# Patient Record
Sex: FEMALE | Race: WHITE | HISPANIC OR LATINO | Employment: FULL TIME | URBAN - METROPOLITAN AREA
[De-identification: names, ages, dates, MRNs, and addresses within clinical notes are randomized per-mention and may not be internally consistent; named-entity substitution may affect disease eponyms.]

---

## 2017-06-15 PROBLEM — R10.13 EPIGASTRIC PAIN: Status: ACTIVE | Noted: 2017-06-15

## 2017-06-15 PROBLEM — E80.6 HYPERBILIRUBINEMIA: Status: ACTIVE | Noted: 2017-06-15

## 2017-08-08 PROBLEM — K80.20 CALCULUS OF GALLBLADDER: Status: ACTIVE | Noted: 2017-08-08

## 2017-08-28 PROBLEM — E66.811 OBESITY, CLASS I, BMI 30-34.9: Status: ACTIVE | Noted: 2017-08-28

## 2017-08-28 PROBLEM — E80.6 HYPERBILIRUBINEMIA: Status: RESOLVED | Noted: 2017-06-15 | Resolved: 2017-08-28

## 2017-08-28 PROBLEM — K29.50 CHRONIC GASTRITIS: Chronic | Status: ACTIVE | Noted: 2017-08-28

## 2017-08-28 PROBLEM — E66.9 OBESITY, CLASS I, BMI 30-34.9: Status: ACTIVE | Noted: 2017-08-28

## 2017-08-29 PROBLEM — K80.20 CALCULUS OF GALLBLADDER WITHOUT CHOLECYSTITIS WITHOUT OBSTRUCTION: Status: ACTIVE | Noted: 2017-08-29

## 2019-10-25 ENCOUNTER — TELEPHONE (OUTPATIENT)
Dept: ADMINISTRATIVE | Facility: HOSPITAL | Age: 49
End: 2019-10-25

## 2019-10-28 ENCOUNTER — TELEPHONE (OUTPATIENT)
Dept: ADMINISTRATIVE | Facility: HOSPITAL | Age: 49
End: 2019-10-28

## 2020-06-30 ENCOUNTER — HISTORICAL (OUTPATIENT)
Dept: ADMINISTRATIVE | Facility: HOSPITAL | Age: 50
End: 2020-06-30

## 2020-06-30 LAB
COVID-19 INTERNAL CONTROL: NORMAL
SARS-COV-2 RNA RESP QL NAA+PROBE: NOT DETECTED

## 2020-08-29 ENCOUNTER — HOSPITAL ENCOUNTER (INPATIENT)
Facility: HOSPITAL | Age: 50
LOS: 3 days | Discharge: HOME | DRG: 310 | End: 2020-09-01
Attending: EMERGENCY MEDICINE | Admitting: INTERNAL MEDICINE
Payer: MEDICAID

## 2020-08-29 DIAGNOSIS — R07.9 CHEST PAIN: ICD-10-CM

## 2020-08-29 DIAGNOSIS — R79.89 ELEVATED TROPONIN: ICD-10-CM

## 2020-08-29 DIAGNOSIS — I47.10 SVT (SUPRAVENTRICULAR TACHYCARDIA): Primary | ICD-10-CM

## 2020-08-29 LAB
ALBUMIN SERPL BCP-MCNC: 3.9 G/DL (ref 3.5–5.2)
ALP SERPL-CCNC: 71 U/L (ref 55–135)
ALT SERPL W/O P-5'-P-CCNC: 34 U/L (ref 10–44)
AMPHET+METHAMPHET UR QL: NEGATIVE
ANION GAP SERPL CALC-SCNC: 9 MMOL/L (ref 8–16)
AST SERPL-CCNC: 22 U/L (ref 10–40)
BARBITURATES UR QL SCN>200 NG/ML: NEGATIVE
BASOPHILS # BLD AUTO: 0.07 K/UL (ref 0–0.2)
BASOPHILS NFR BLD: 0.9 % (ref 0–1.9)
BENZODIAZ UR QL SCN>200 NG/ML: NEGATIVE
BILIRUB SERPL-MCNC: 0.6 MG/DL (ref 0.1–1)
BILIRUB UR QL STRIP: NEGATIVE
BUN SERPL-MCNC: 11 MG/DL (ref 6–20)
BZE UR QL SCN: NEGATIVE
CALCIUM SERPL-MCNC: 8.9 MG/DL (ref 8.7–10.5)
CANNABINOIDS UR QL SCN: NEGATIVE
CHLORIDE SERPL-SCNC: 106 MMOL/L (ref 95–110)
CK MB SERPL-MCNC: 3.8 NG/ML (ref 0.1–6.5)
CK MB SERPL-RTO: 2.7 % (ref 0–5)
CK SERPL-CCNC: 141 U/L (ref 20–180)
CK SERPL-CCNC: 141 U/L (ref 20–180)
CLARITY UR: CLEAR
CO2 SERPL-SCNC: 23 MMOL/L (ref 23–29)
COLOR UR: YELLOW
CREAT SERPL-MCNC: 0.8 MG/DL (ref 0.5–1.4)
CREAT UR-MCNC: 81.5 MG/DL (ref 15–325)
DIFFERENTIAL METHOD: ABNORMAL
EOSINOPHIL # BLD AUTO: 0.1 K/UL (ref 0–0.5)
EOSINOPHIL NFR BLD: 0.9 % (ref 0–8)
ERYTHROCYTE [DISTWIDTH] IN BLOOD BY AUTOMATED COUNT: 12 % (ref 11.5–14.5)
EST. GFR  (AFRICAN AMERICAN): >60 ML/MIN/1.73 M^2
EST. GFR  (NON AFRICAN AMERICAN): >60 ML/MIN/1.73 M^2
GLUCOSE SERPL-MCNC: 141 MG/DL (ref 70–110)
GLUCOSE UR QL STRIP: NEGATIVE
HCT VFR BLD AUTO: 42.8 % (ref 37–48.5)
HGB BLD-MCNC: 14.4 G/DL (ref 12–16)
HGB UR QL STRIP: NEGATIVE
IMM GRANULOCYTES # BLD AUTO: 0.03 K/UL (ref 0–0.04)
IMM GRANULOCYTES NFR BLD AUTO: 0.4 % (ref 0–0.5)
KETONES UR QL STRIP: NEGATIVE
LEUKOCYTE ESTERASE UR QL STRIP: NEGATIVE
LIPASE SERPL-CCNC: 66 U/L (ref 23–300)
LYMPHOCYTES # BLD AUTO: 2.9 K/UL (ref 1–4.8)
LYMPHOCYTES NFR BLD: 34.7 % (ref 18–48)
MAGNESIUM SERPL-MCNC: 2 MG/DL (ref 1.6–2.6)
MCH RBC QN AUTO: 31.2 PG (ref 27–31)
MCHC RBC AUTO-ENTMCNC: 33.6 G/DL (ref 32–36)
MCV RBC AUTO: 93 FL (ref 82–98)
METHADONE UR QL SCN>300 NG/ML: NEGATIVE
MONOCYTES # BLD AUTO: 0.4 K/UL (ref 0.3–1)
MONOCYTES NFR BLD: 4.7 % (ref 4–15)
NEUTROPHILS # BLD AUTO: 4.8 K/UL (ref 1.8–7.7)
NEUTROPHILS NFR BLD: 58.4 % (ref 38–73)
NITRITE UR QL STRIP: NEGATIVE
NRBC BLD-RTO: 0 /100 WBC
OPIATES UR QL SCN: NEGATIVE
PCP UR QL SCN>25 NG/ML: NEGATIVE
PH UR STRIP: 7 [PH] (ref 5–8)
PLATELET # BLD AUTO: 336 K/UL (ref 150–350)
PMV BLD AUTO: 9.1 FL (ref 9.2–12.9)
POTASSIUM SERPL-SCNC: 3.5 MMOL/L (ref 3.5–5.1)
PROT SERPL-MCNC: 8.1 G/DL (ref 6–8.4)
PROT UR QL STRIP: NEGATIVE
RBC # BLD AUTO: 4.61 M/UL (ref 4–5.4)
SODIUM SERPL-SCNC: 138 MMOL/L (ref 136–145)
SP GR UR STRIP: 1.01 (ref 1–1.03)
TOXICOLOGY INFORMATION: NORMAL
URN SPEC COLLECT METH UR: NORMAL
UROBILINOGEN UR STRIP-ACNC: NEGATIVE EU/DL
WBC # BLD AUTO: 8.22 K/UL (ref 3.9–12.7)

## 2020-08-29 PROCEDURE — 80053 COMPREHEN METABOLIC PANEL: CPT

## 2020-08-29 PROCEDURE — 83690 ASSAY OF LIPASE: CPT

## 2020-08-29 PROCEDURE — 63600175 PHARM REV CODE 636 W HCPCS

## 2020-08-29 PROCEDURE — 93010 EKG 12-LEAD: ICD-10-PCS | Mod: ,,, | Performed by: INTERNAL MEDICINE

## 2020-08-29 PROCEDURE — 85025 COMPLETE CBC W/AUTO DIFF WBC: CPT

## 2020-08-29 PROCEDURE — 80307 DRUG TEST PRSMV CHEM ANLYZR: CPT

## 2020-08-29 PROCEDURE — 82553 CREATINE MB FRACTION: CPT

## 2020-08-29 PROCEDURE — 11000001 HC ACUTE MED/SURG PRIVATE ROOM

## 2020-08-29 PROCEDURE — 81003 URINALYSIS AUTO W/O SCOPE: CPT | Mod: 59

## 2020-08-29 PROCEDURE — 82550 ASSAY OF CK (CPK): CPT

## 2020-08-29 PROCEDURE — 96374 THER/PROPH/DIAG INJ IV PUSH: CPT

## 2020-08-29 PROCEDURE — 83735 ASSAY OF MAGNESIUM: CPT

## 2020-08-29 PROCEDURE — 93010 ELECTROCARDIOGRAM REPORT: CPT | Mod: ,,, | Performed by: INTERNAL MEDICINE

## 2020-08-29 PROCEDURE — 99285 EMERGENCY DEPT VISIT HI MDM: CPT | Mod: 25

## 2020-08-29 PROCEDURE — 93005 ELECTROCARDIOGRAM TRACING: CPT

## 2020-08-29 RX ORDER — ADENOSINE 3 MG/ML
6 INJECTION, SOLUTION INTRAVENOUS
Status: COMPLETED | OUTPATIENT
Start: 2020-08-29 | End: 2020-08-29

## 2020-08-29 RX ORDER — ADENOSINE 3 MG/ML
INJECTION, SOLUTION INTRAVENOUS
Status: COMPLETED
Start: 2020-08-29 | End: 2020-08-29

## 2020-08-29 RX ORDER — LISINOPRIL 10 MG/1
10 TABLET ORAL DAILY
COMMUNITY
End: 2021-11-15

## 2020-08-29 RX ADMIN — ADENOSINE 6 MG: 3 INJECTION, SOLUTION INTRAVENOUS at 09:08

## 2020-08-30 PROBLEM — I47.10 SVT (SUPRAVENTRICULAR TACHYCARDIA): Status: ACTIVE | Noted: 2020-08-30

## 2020-08-30 LAB
MAGNESIUM SERPL-MCNC: 2 MG/DL (ref 1.6–2.6)
SARS-COV-2 RDRP RESP QL NAA+PROBE: NEGATIVE
TROPONIN I SERPL DL<=0.01 NG/ML-MCNC: 0.23 NG/ML (ref 0–0.03)
TROPONIN I SERPL DL<=0.01 NG/ML-MCNC: 0.27 NG/ML (ref 0–0.03)
TSH SERPL DL<=0.005 MIU/L-ACNC: 3.39 UIU/ML (ref 0.4–4)

## 2020-08-30 PROCEDURE — 11000001 HC ACUTE MED/SURG PRIVATE ROOM

## 2020-08-30 PROCEDURE — 36415 COLL VENOUS BLD VENIPUNCTURE: CPT

## 2020-08-30 PROCEDURE — G0378 HOSPITAL OBSERVATION PER HR: HCPCS

## 2020-08-30 PROCEDURE — 96372 THER/PROPH/DIAG INJ SC/IM: CPT

## 2020-08-30 PROCEDURE — 84484 ASSAY OF TROPONIN QUANT: CPT | Mod: 91

## 2020-08-30 PROCEDURE — 25000003 PHARM REV CODE 250: Performed by: INTERNAL MEDICINE

## 2020-08-30 PROCEDURE — 63600175 PHARM REV CODE 636 W HCPCS: Performed by: INTERNAL MEDICINE

## 2020-08-30 PROCEDURE — 83735 ASSAY OF MAGNESIUM: CPT

## 2020-08-30 PROCEDURE — U0002 COVID-19 LAB TEST NON-CDC: HCPCS

## 2020-08-30 PROCEDURE — 84443 ASSAY THYROID STIM HORMONE: CPT

## 2020-08-30 RX ORDER — PANTOPRAZOLE SODIUM 40 MG/10ML
40 INJECTION, POWDER, LYOPHILIZED, FOR SOLUTION INTRAVENOUS DAILY
Status: DISCONTINUED | OUTPATIENT
Start: 2020-08-30 | End: 2020-09-01 | Stop reason: HOSPADM

## 2020-08-30 RX ORDER — ENOXAPARIN SODIUM 100 MG/ML
40 INJECTION SUBCUTANEOUS EVERY 24 HOURS
Status: DISCONTINUED | OUTPATIENT
Start: 2020-08-30 | End: 2020-09-01 | Stop reason: HOSPADM

## 2020-08-30 RX ORDER — ONDANSETRON 4 MG/1
4 TABLET, FILM COATED ORAL EVERY 6 HOURS PRN
Status: DISCONTINUED | OUTPATIENT
Start: 2020-08-30 | End: 2020-09-01 | Stop reason: HOSPADM

## 2020-08-30 RX ORDER — PANTOPRAZOLE SODIUM 40 MG/1
40 TABLET, DELAYED RELEASE ORAL DAILY
Status: DISCONTINUED | OUTPATIENT
Start: 2020-08-30 | End: 2020-08-30

## 2020-08-30 RX ORDER — SODIUM CHLORIDE 0.9 % (FLUSH) 0.9 %
10 SYRINGE (ML) INJECTION
Status: DISCONTINUED | OUTPATIENT
Start: 2020-08-30 | End: 2020-09-01 | Stop reason: HOSPADM

## 2020-08-30 RX ORDER — LISINOPRIL 10 MG/1
10 TABLET ORAL DAILY
Status: DISCONTINUED | OUTPATIENT
Start: 2020-08-30 | End: 2020-09-01 | Stop reason: HOSPADM

## 2020-08-30 RX ORDER — DOCUSATE SODIUM 100 MG/1
100 CAPSULE, LIQUID FILLED ORAL 2 TIMES DAILY
Status: DISCONTINUED | OUTPATIENT
Start: 2020-08-30 | End: 2020-09-01 | Stop reason: HOSPADM

## 2020-08-30 RX ADMIN — LISINOPRIL 10 MG: 10 TABLET ORAL at 11:08

## 2020-08-30 RX ADMIN — DOCUSATE SODIUM 100 MG: 100 CAPSULE, LIQUID FILLED ORAL at 11:08

## 2020-08-30 RX ADMIN — PANTOPRAZOLE SODIUM 40 MG: 40 TABLET, DELAYED RELEASE ORAL at 11:08

## 2020-08-30 RX ADMIN — DOCUSATE SODIUM 100 MG: 100 CAPSULE, LIQUID FILLED ORAL at 08:08

## 2020-08-30 RX ADMIN — ENOXAPARIN SODIUM 40 MG: 40 INJECTION SUBCUTANEOUS at 04:08

## 2020-08-30 NOTE — NURSING
Pt up floor via wheelchair.  No signs of distress noted at this time. Denies pain or discomfort. V/S WNL.  Pt oriented to room.Instructed to call for assistance if needed.

## 2020-08-30 NOTE — ED PROVIDER NOTES
Encounter Date: 8/29/2020       History     Chief Complaint   Patient presents with    Weakness     Daughter reports pt has been working and started having chest pain. Pt having extreme weakness. Pt neuro exam equal but poor effort.     Chest Pain     Patient is a 50-year-old  female with past medical history significant for chronic gastritis, gallbladder issues, hypertension who presents to the emergency department with 1 hr history of chest pressure, palpitations and mild shortness of breath.  Patient's daughter is translating for us.        Review of patient's allergies indicates:  No Known Allergies  Past Medical History:   Diagnosis Date    Calculus of gallbladder     Chronic gastritis 8/28/2017    GERD (gastroesophageal reflux disease)     H. pylori infection     Hyperbilirubinemia 6/15/2017    Hypertension     Obesity      Past Surgical History:   Procedure Laterality Date    APPENDECTOMY      CHOLECYSTECTOMY N/A 08/29/2017    WISDOM TOOTH EXTRACTION       Family History   Problem Relation Age of Onset    Heart attack Father     Lung cancer Maternal Grandmother     Breast cancer Paternal Aunt     Breast cancer Paternal Uncle     Breast cancer Paternal Grandmother      Social History     Tobacco Use    Smoking status: Never Smoker    Smokeless tobacco: Never Used   Substance Use Topics    Alcohol use: Yes    Drug use: No     Review of Systems   Constitutional: Positive for diaphoresis. Negative for chills, fever and unexpected weight change.   HENT: Negative for congestion.    Respiratory: Negative for cough, chest tightness, shortness of breath, wheezing and stridor.    Cardiovascular: Positive for chest pain and palpitations. Negative for leg swelling.   Gastrointestinal: Negative for abdominal pain, anal bleeding, blood in stool, constipation, diarrhea, nausea and vomiting.   Genitourinary: Negative for dysuria, flank pain, frequency and hematuria.   Musculoskeletal: Negative  for back pain and neck pain.   Neurological: Negative for dizziness and headaches.   All other systems reviewed and are negative.      Physical Exam     Initial Vitals   BP Pulse Resp Temp SpO2   08/29/20 2153 08/29/20 2148 08/29/20 2148 08/30/20 0048 08/29/20 2148   (!) 138/104 (!) 189 (!) 26 97.7 °F (36.5 °C) 96 %      MAP       --                Physical Exam    Nursing note and vitals reviewed.  Constitutional: She appears well-developed and well-nourished. She is not diaphoretic. No distress.   HENT:   Head: Normocephalic and atraumatic.   Neck: Normal range of motion. Neck supple.   Cardiovascular: Regular rhythm, normal heart sounds and intact distal pulses.   Tachycardic in SVT   Pulmonary/Chest: Breath sounds normal. She is in respiratory distress. She has no wheezes. She has no rhonchi. She has no rales. She exhibits no tenderness.   Abdominal: Soft. She exhibits no distension. There is no abdominal tenderness. There is no rebound and no guarding.   Musculoskeletal: Normal range of motion. No edema.   Neurological: She is alert and oriented to person, place, and time.   Skin: Skin is warm and dry.         ED Course   Procedures  Labs Reviewed   CBC W/ AUTO DIFFERENTIAL - Abnormal; Notable for the following components:       Result Value    Mean Corpuscular Hemoglobin 31.2 (*)     MPV 9.1 (*)     All other components within normal limits   COMPREHENSIVE METABOLIC PANEL - Abnormal; Notable for the following components:    Glucose 141 (*)     All other components within normal limits   DRUG SCREEN PANEL, URINE EMERGENCY    Narrative:     Specimen Source->Urine   LIPASE   MAGNESIUM   URINALYSIS    Narrative:     Specimen Source->Urine   CK   CK-MB   SARS-COV-2 RNA AMPLIFICATION, QUAL        ECG Results          EKG 12-lead (Final result)  Result time 08/30/20 00:50:14    Final result by Interface, Lab In [facility] (08/30/20 00:50:14)                 Narrative:    Test Reason : R07.9,    Vent. Rate : 130 BPM      Atrial Rate : 130 BPM     P-R Int : 142 ms          QRS Dur : 078 ms      QT Int : 314 ms       P-R-T Axes : 065 049 062 degrees     QTc Int : 462 ms    Sinus tachycardia  Otherwise normal ECG  Abnormal ECG  When compared with ECG of 29-AUG-2020 21:47,  Decrease in heart rate by 60 bpm  ST no longer depressed in Inferior leads  ST no longer depressed in Lateral leads    Confirmed by Donnie Perez MD ([id number]) on 8/30/2020 12:50:02 AM    Referred By: System System           Confirmed By:Donnie Perez MD                             EKG 12-lead (Final result)  Result time 08/30/20 00:46:56    Final result by Interface, Lab In [facility] (08/30/20 00:46:56)                 Narrative:    Test Reason : R07.9,    Vent. Rate : 191 BPM     Atrial Rate : 192 BPM     P-R Int : 000 ms          QRS Dur : 072 ms      QT Int : 242 ms       P-R-T Axes : 000 058 254 degrees     QTc Int : 431 ms    Supraventricular tachycardia  Marked ST abnormality, possible inferolateral subendocardial injury  Abnormal ECG  No previous ECGs available  Confirmed by Donnie Perez MD ([id number]) on 8/30/2020 12:46:41 AM    Referred By: System System           Confirmed By:Donnie Perez MD                            Imaging Results    None                            ED Course as of Aug 30 0059   Sun Aug 30, 2020   0053 Patient's lab work is unimpressive, and cardiac workup as well as negative.  Patient was in supraventricular tachycardia with a rate of 190.  Patient converted with 6 mg of adenosine and symptoms completely resolved.  Currently patient's heart rate is normal sinus rhythm at 86.  Will admit under observation    [RB]      ED Course User Index  [RB] Sharath Doe MD                Clinical Impression:       ICD-10-CM ICD-9-CM   1. SVT (supraventricular tachycardia)  I47.1 427.89   2. Chest pain  R07.9 786.50             ED Disposition Condition    Admit                           Sharath Doe MD  08/30/20 0057       Sharath Doe MD  08/30/20 0059

## 2020-08-30 NOTE — ED NOTES
Informed pt that she will be going up to a room on med surg when nurse is ready to assume care. Pt v/u. Denies pain. V/s stable.Family at bedside. Call bell within reach. Pt resting comfortably at present.

## 2020-08-30 NOTE — H&P
[facility]    History & Physical      Patient Name: Awa Vargas  MRN: [id number]  Admission Date: 8/29/2020  Attending Physician: Jones Anaya MD   Primary Care Provider: [facility]         Patient information was obtained from patient and ER records.     Subjective:     Principal Problem:SVT (supraventricular tachycardia)    Chief Complaint:   Chief Complaint   Patient presents with    Weakness     Daughter reports pt has been working and started having chest pain. Pt having extreme weakness. Pt neuro exam equal but poor effort.     Chest Pain        HPI: 51 y/o Nepali female with history of gallstones, post cholecystectomy, gerd presented to the ed with complaints of worsening weakness and chest discomfort and further work up and eval in ed revealing that she was in svt and coverted medically with adneosine and still cmplaints of chest discomforta nd troponins seems to be levated and further admitted for obs for nstemi and psvt, still complaints of midsternal chest discomfort    Past Medical History:   Diagnosis Date    Calculus of gallbladder     Chronic gastritis 8/28/2017    GERD (gastroesophageal reflux disease)     H. pylori infection     Hyperbilirubinemia 6/15/2017    Hypertension     Obesity        Past Surgical History:   Procedure Laterality Date    APPENDECTOMY      CHOLECYSTECTOMY N/A 08/29/2017    WISDOM TOOTH EXTRACTION         Review of patient's allergies indicates:  No Known Allergies    No current facility-administered medications on file prior to encounter.      Current Outpatient Medications on File Prior to Encounter   Medication Sig    lisinopriL 10 MG tablet Take 10 mg by mouth once daily.    docusate sodium (COLACE) 100 MG capsule Take 1 capsule (100 mg total) by mouth 2 (two) times daily.    omeprazole (PRILOSEC) 40 MG capsule Take 1 capsule (40 mg total) by mouth once daily.    ondansetron (ZOFRAN) 4 MG tablet Take 1  tablet (4 mg total) by mouth every 8 (eight) hours as needed.    oxycodone-acetaminophen (PERCOCET) 5-325 mg per tablet Take 1-2 tablets by mouth every 4 (four) hours as needed for Pain.     Family History     Problem Relation (Age of Onset)    Breast cancer Paternal Aunt, Paternal Uncle, Paternal Grandmother    Heart attack Father    Lung cancer Maternal Grandmother        Tobacco Use    Smoking status: Never Smoker    Smokeless tobacco: Never Used   Substance and Sexual Activity    Alcohol use: Yes    Drug use: No    Sexual activity: Not on file     Review of Systems   Constitutional: Positive for fatigue.   HENT: Negative.    Eyes: Negative.    Respiratory: Negative.    Cardiovascular: Positive for chest pain.   Gastrointestinal: Negative.    Endocrine: Negative.    Genitourinary: Negative.    Musculoskeletal: Negative.    Skin: Negative.    Allergic/Immunologic: Negative.    Neurological: Negative.    Hematological: Negative.    Psychiatric/Behavioral: The patient is nervous/anxious.      Objective:     Vital Signs (Most Recent):  Temp: 98.2 °F (36.8 °C) (08/30/20 0729)  Pulse: 71 (08/30/20 0729)  Resp: 20 (08/30/20 0729)  BP: 125/74 (08/30/20 0729)  SpO2: 99 % (08/30/20 0729) Vital Signs (24h Range):  Temp:  [97.6 °F (36.4 °C)-98.2 °F (36.8 °C)] 98.2 °F (36.8 °C)  Pulse:  [] 71  Resp:  [10-26] 20  SpO2:  [95 %-100 %] 99 %  BP: (122-161)/() 125/74     Weight: 83.9 kg (185 lb)  Body mass index is 34.96 kg/m².    Physical Exam  Constitutional:       Appearance: Normal appearance. She is obese.   HENT:      Head: Normocephalic and atraumatic.      Right Ear: Tympanic membrane normal.      Left Ear: Tympanic membrane normal.      Nose: Nose normal.      Mouth/Throat:      Mouth: Mucous membranes are dry.   Eyes:      Pupils: Pupils are equal, round, and reactive to light.   Neck:      Musculoskeletal: Normal range of motion and neck supple.   Cardiovascular:      Rate and Rhythm: Regular rhythm.  Tachycardia present.      Pulses: Normal pulses.      Heart sounds: Normal heart sounds.   Pulmonary:      Breath sounds: Normal breath sounds.   Abdominal:      General: Abdomen is flat. Bowel sounds are normal.      Palpations: Abdomen is soft.   Musculoskeletal: Normal range of motion.   Skin:     General: Skin is warm and dry.      Capillary Refill: Capillary refill takes less than 2 seconds.   Neurological:      General: No focal deficit present.      Mental Status: She is alert.   Psychiatric:         Mood and Affect: Mood normal.         Behavior: Behavior normal.           CRANIAL NERVES     CN III, IV, VI   Pupils are equal, round, and reactive to light.      Significant Labs:   Recent Lab Results       08/30/20  0838   08/30/20  0316   08/30/20  0056   08/29/20  2218   08/29/20  2156        Lipase Result         66     Benzodiazepines       Negative       Methadone metabolites       Negative       Phencyclidine       Negative       Albumin         3.9     Alkaline Phosphatase         71     ALT         34     Amphetamine Screen, Ur       Negative       Anion Gap         9     Appearance, UA       Clear       AST         22     Barbiturate Screen, Ur       Negative       Baso #               Basophil%               Bilirubin (UA)       Negative       BILIRUBIN TOTAL         0.6  Comment:  For infants and newborns, interpretation of results should be based  on gestational age, weight and in agreement with clinical  observations.  Premature Infant recommended reference ranges:  Up to 24 hours.............<8.0 mg/dL  Up to 48 hours............<12.0 mg/dL  3-5 days..................<15.0 mg/dL  6-29 days.................<15.0 mg/dL  For patients on Eltrombopag therapy, use of Dimension [place] TBIL is   not   recommended.       BUN, Bld         11     Calcium         8.9     Chloride         106     CO2         23     Cocaine (Metab.)       Negative       Color, UA       Yellow       CPK         141               141     CPK MB         3.8  Comment:  ATTENTION: The use of Biotin Supplements may interfere with this   assay.       Creatinine         0.8     Creatinine, Random Ur       81.5  Comment:  The random urine reference ranges provided were established   for 24 hour urine collections.  No reference ranges exist for  random urine specimens.  Correlate clinically.         Differential Method               eGFR if          >60.0     eGFR if non          >60.0  Comment:  Calculation used to obtain the estimated glomerular filtration  rate (eGFR) is the CKD-EPI equation.        Eos #               Eosinophil%               Glucose         141     Glucose, UA       Negative       Gran # (ANC)               Gran%               Hematocrit               Hemoglobin               Immature Grans (Abs)               Immature Granulocytes               Ketones, UA       Negative       Leukocytes, UA       Negative       Lymph #               Lymph%               Magnesium 2.0       2.0     MB%         2.7  Comment:  To be positive, the MB% must be greater than 5% AND the CK-MB  greater than 6.5 ng/mL. Values not in the reference interval,   but not qualifying as positive, should be considered   &quot;trace&quot;.       MCH               MCHC               MCV               Mono #               Mono%               MPV               NITRITE UA       Negative       nRBC               Occult Blood UA       Negative       Opiate Scrn, Ur       Negative       pH, UA       7.0       Platelets               Potassium         3.5     PROTEIN TOTAL         8.1     Protein, UA       Negative  Comment:  Recommend a 24 hour urine protein or a urine   protein/creatinine ratio if globulin induced proteinuria is  clinically suspected.         RBC               RDW               SARS-CoV-2 RNA, Amplification, Qual     Negative  Comment:  This test utilizes isothermal nucleic acid amplification   technology to detect  the SARS-CoV-2 RdRp nucleic acid segment.   The analytical sensitivity (limit of detection) is 125 genome   equivalents/mL.   A POSITIVE result implies infection with the SARS-CoV-2 virus;  the patient is presumed to be contagious.    A NEGATIVE result means that SARS-CoV-2 nucleic acids are not  present above the limit of detection. A NEGATIVE result should be   treated as presumptive. It does not rule out the possibility of   COVID-19 and should not be the sole basis for treatment decisions.   If COVID-19 is strongly suspected based on clinical and exposure   history, re-testing using an alternate molecular assay should be   considered.   This test is only for use under the Food and Drug   Administration s Emergency Use Authorization (EUA).   Commercial kits are provided by [employer].   Performance characteristics of the EUA have been independently  verified by [facility] Department of  Pathology and Laboratory Medicine.   _________________________________________________________________  The ID NOW COVID-19 Letter of Authorization, along with the   authorized Fact Sheet for Healthcare Providers, the authorized Fact  Sheet for Patients, and authorized labeling are available on the FDA   website:  www.fda.gov/MedicalDevices/Safety/EmergencySituations/hwp619191.htm           Sodium         138     Specific [place], UA       1.015       Specimen UA       Urine, Clean Catch       Marijuana (THC) Metabolite       Negative       Toxicology Information       SEE COMMENT  Comment:  This screen includes the following classes of drugs at the   listed cut-off:  Benzodiazepines                  200 ng/ml  Methadone                        300 ng/ml  Cocaine metabolite               300 ng/ml  Opiates                         2000 ng/ml  Barbiturates                     200 ng/ml  Amphetamines                    1000 ng/ml  Marijuana metabs (THC)            50 ng/ml  Phencyclidine (PCP)               25  ng/ml  **Intended use : The UDS methods provide only a preliminary result.    A more   specific alternate chemical method must be used in order to obtain a   confirmed analytical result.  Gas chromatography mass spectrometry   (GC/MS)   is the preferred confirmatory method.  Clinical consideration and   professional judgement should be applied to any drug of abuse test   result.    Particularly when preliminary results are used.     ** Results:  Positive results are only preliminary and only suggest   the   sample may contain the analyte being tested.  Negative results   indicate that   the sample does not contain the analyte or the analyte is present in   concentrations below the cut-off level.         Troponin I 0.235  Comment:  The reference interval for Troponin I represents the 99th percentile   cutoff   for our facility and is consistent with 3rd generation assay   performance.  ATTENTION: The use of Biotin Supplements may interfere with this   assay.   0.266  Comment:  The reference interval for Troponin I represents the 99th percentile   cutoff   for our facility and is consistent with 3rd generation assay   performance.  ATTENTION: The use of Biotin Supplements may interfere with this   assay.             TSH 3.390  Comment:  ATTENTION: The use of Biotin Supplements may interfere with this   assay.               UROBILINOGEN UA       Negative       WBC                                08/29/20  2155        Lipase Result       Benzodiazepines       Methadone metabolites       Phencyclidine       Albumin       Alkaline Phosphatase       ALT       Amphetamine Screen, Ur       Anion Gap       Appearance, UA       AST       Barbiturate Screen, Ur       Baso # 0.07     Basophil% 0.9     Bilirubin (UA)       BILIRUBIN TOTAL       BUN, Bld       Calcium       Chloride       CO2       Cocaine (Metab.)       Color, UA       CPK       CPK MB       Creatinine       Creatinine, Random Ur       Differential Method  Automated     eGFR if        eGFR if non        Eos # 0.1     Eosinophil% 0.9     Glucose       Glucose, UA       Gran # (ANC) 4.8     Gran% 58.4     Hematocrit 42.8     Hemoglobin 14.4     Immature Grans (Abs) 0.03  Comment:  Mild elevation in immature granulocytes is non specific and   can be seen in a variety of conditions including stress response,   acute inflammation, trauma and pregnancy. Correlation with other   laboratory and clinical findings is essential.       Immature Granulocytes 0.4     Ketones, UA       Leukocytes, UA       Lymph # 2.9     Lymph% 34.7     Magnesium       MB%       MCH 31.2     MCHC 33.6     MCV 93     Mono # 0.4     Mono% 4.7     MPV 9.1     NITRITE UA       nRBC 0     Occult Blood UA       Opiate Scrn, Ur       pH, UA       Platelets 336     Potassium       PROTEIN TOTAL       Protein, UA       RBC 4.61     RDW 12.0     SARS-CoV-2 RNA, Amplification, Qual       Sodium       Specific Gravity, UA       Specimen UA       Marijuana (THC) Metabolite       Toxicology Information       Troponin I       TSH       UROBILINOGEN UA       WBC 8.22               Assessment/Plan:     Active Diagnoses:    Diagnosis Date Noted POA    PRINCIPAL PROBLEM:  SVT (supraventricular tachycardia) [I47.1] 08/30/2020 Yes      Problems Resolved During this Admission:     VTE Risk Mitigation (From admission, onward)         Ordered     enoxaparin injection 40 mg  Every 24 hours      08/30/20 1050     IP VTE LOW RISK PATIENT  Once      08/30/20 0305            NSTEMI  PSV - RESOLVED  ?GERD  H/O CHOLITHIASIS    PLAN :  TELE  TREND ENZYMES  ADD IV PROTONIX  CONTINUE TO MONITOR FOR TODAY  POSSIBLE DC IN AM IF SYMPTOMS RESOLVES AND ENZYMES NEGATIVE WITH CLOSE FOLLOW UP WITH [facility] OUT PT  CODE STATUS FULL      Jones Anaya MD  Department of Hospital Medicine   [facility]

## 2020-08-30 NOTE — ED NOTES
"Pt states she is feeling "much better". Pt up to [facility] without feeling dizzy, HR increases, or chest pain.   "

## 2020-08-31 PROBLEM — F41.9 ANXIETY: Status: ACTIVE | Noted: 2020-08-31

## 2020-08-31 PROBLEM — R79.89 ELEVATED TROPONIN: Status: ACTIVE | Noted: 2020-08-31

## 2020-08-31 PROBLEM — R07.9 CHEST PAIN: Status: ACTIVE | Noted: 2020-08-31

## 2020-08-31 LAB — TROPONIN I SERPL DL<=0.01 NG/ML-MCNC: 0.09 NG/ML (ref 0–0.03)

## 2020-08-31 PROCEDURE — 36415 COLL VENOUS BLD VENIPUNCTURE: CPT

## 2020-08-31 PROCEDURE — 84484 ASSAY OF TROPONIN QUANT: CPT

## 2020-08-31 PROCEDURE — 96375 TX/PRO/DX INJ NEW DRUG ADDON: CPT

## 2020-08-31 PROCEDURE — C9113 INJ PANTOPRAZOLE SODIUM, VIA: HCPCS | Performed by: INTERNAL MEDICINE

## 2020-08-31 PROCEDURE — 11000001 HC ACUTE MED/SURG PRIVATE ROOM

## 2020-08-31 PROCEDURE — 25000003 PHARM REV CODE 250: Performed by: INTERNAL MEDICINE

## 2020-08-31 PROCEDURE — 25000003 PHARM REV CODE 250: Performed by: NURSE PRACTITIONER

## 2020-08-31 PROCEDURE — 63600175 PHARM REV CODE 636 W HCPCS: Performed by: INTERNAL MEDICINE

## 2020-08-31 RX ORDER — METOPROLOL SUCCINATE 25 MG/1
25 TABLET, EXTENDED RELEASE ORAL DAILY
Status: DISCONTINUED | OUTPATIENT
Start: 2020-08-31 | End: 2020-09-01 | Stop reason: HOSPADM

## 2020-08-31 RX ADMIN — PANTOPRAZOLE SODIUM 40 MG: 40 INJECTION, POWDER, LYOPHILIZED, FOR SOLUTION INTRAVENOUS at 09:08

## 2020-08-31 RX ADMIN — METOPROLOL SUCCINATE 25 MG: 25 TABLET, EXTENDED RELEASE ORAL at 02:08

## 2020-08-31 RX ADMIN — LISINOPRIL 10 MG: 10 TABLET ORAL at 08:08

## 2020-08-31 RX ADMIN — ENOXAPARIN SODIUM 40 MG: 40 INJECTION SUBCUTANEOUS at 05:08

## 2020-08-31 RX ADMIN — DOCUSATE SODIUM 100 MG: 100 CAPSULE, LIQUID FILLED ORAL at 08:08

## 2020-08-31 NOTE — PROGRESS NOTES
[facility] Medicine  Progress Note    Patient Name: Awa Vargas  MRN: [id number]  Patient Class: OP- Observation   Admission Date: 8/29/2020  Length of Stay: 0 days  Attending Physician: Ioana Denney MD  Primary Care Provider: [facility]        Subjective:     Principal Problem:SVT (supraventricular tachycardia)        HPI:  49 y/o Lebanese female with history of hypertension, gallstones, post cholecystectomy, gerd presented to the ed with complaints of worsening weakness and chest discomfort and further work up and eval in ed revealing that she was in svt and coverted medically with adneosine and still complaints of chest discomforta and troponins seems to be elevated and further admitted for  nstemi and psvt, still complaints of midsternal chest discomfort. Patient reports that she has similar episode with pregnancy and was instructed to follow up with cardiology however never did so.  She has been experiencing a lot of anxiety lately. Sees [facility] for PCP    Overview/Hospital Course:  8/31/20 LF patient via  reports that chest pain is improved but still present. States pain is midsternal and feels like pushing. She is still complaining of weakness and overall not feeling good    Interval History: see hospital course    Review of Systems   Constitutional: Positive for fatigue. Negative for activity change, appetite change, chills, diaphoresis, fever and unexpected weight change.   HENT: Negative for congestion, postnasal drip, sore throat and trouble swallowing.    Eyes: Negative for discharge.   Respiratory: Positive for chest tightness. Negative for cough, shortness of breath and wheezing.    Cardiovascular: Positive for chest pain. Negative for palpitations and leg swelling.   Gastrointestinal: Negative for abdominal pain, blood in stool, constipation, diarrhea, nausea and vomiting.   Genitourinary: Negative for decreased urine  volume, difficulty urinating, dysuria, hematuria and urgency.   Musculoskeletal: Negative for arthralgias.   Skin: Negative for rash and wound.   Neurological: Negative for dizziness, speech difficulty, numbness and headaches.   Psychiatric/Behavioral: Negative for agitation and sleep disturbance.     Objective:     Vital Signs (Most Recent):  Temp: 98.2 °F (36.8 °C) (08/31/20 0645)  Pulse: 69 (08/31/20 0645)  Resp: 18 (08/31/20 0645)  BP: 114/71 (08/31/20 0645)  SpO2: 99 % (08/31/20 0645) Vital Signs (24h Range):  Temp:  [97.8 °F (36.6 °C)-98.7 °F (37.1 °C)] 98.2 °F (36.8 °C)  Pulse:  [66-82] 69  Resp:  [16-20] 18  SpO2:  [95 %-100 %] 99 %  BP: (114-134)/(64-80) 114/71     Weight: 83.9 kg (185 lb)  Body mass index is 34.96 kg/m².    Intake/Output Summary (Last 24 hours) at 8/31/2020 0947  Last data filed at 8/31/2020 0600  Gross per 24 hour   Intake 3960 ml   Output 2950 ml   Net 1010 ml      Physical Exam  Vitals signs and nursing note reviewed.   Constitutional:       Appearance: Normal appearance.   HENT:      Head: Normocephalic and atraumatic.      Right Ear: Hearing normal.      Left Ear: Hearing normal.      Nose: Nose normal.      Mouth/Throat:      Lips: Pink.      Mouth: Mucous membranes are moist.   Eyes:      Pupils: Pupils are equal, round, and reactive to light.   Neck:      Musculoskeletal: Full passive range of motion without pain and normal range of motion.   Cardiovascular:      Rate and Rhythm: Normal rate and regular rhythm.      Pulses: Normal pulses.      Heart sounds: Normal heart sounds.   Pulmonary:      Effort: Pulmonary effort is normal.      Breath sounds: Normal breath sounds.   Abdominal:      General: Abdomen is flat. Bowel sounds are normal.      Palpations: Abdomen is soft.   Musculoskeletal: Normal range of motion.   Skin:     General: Skin is warm and dry.      Capillary Refill: Capillary refill takes less than 2 seconds.   Neurological:      General: No focal deficit present.       Mental Status: She is alert and oriented to person, place, and time. Mental status is at baseline.      Cranial Nerves: Cranial nerves are intact.   Psychiatric:         Mood and Affect: Mood normal.         Speech: Speech normal.         Behavior: Behavior normal.         Thought Content: Thought content normal.         Judgment: Judgment normal.         Significant Labs:   CBC:   Recent Labs   Lab 08/29/20  2155   WBC 8.22   HGB 14.4   HCT 42.8        CMP:   Recent Labs   Lab 08/29/20  2156      K 3.5      CO2 23   *   BUN 11   CREATININE 0.8   CALCIUM 8.9   PROT 8.1   ALBUMIN 3.9   BILITOT 0.6   ALKPHOS 71   AST 22   ALT 34   ANIONGAP 9   EGFRNONAA >60.0     Troponin:   Recent Labs   Lab 08/30/20  0316 08/30/20  0838 08/31/20  0518   TROPONINI 0.266* 0.235* 0.089*     TSH:   Recent Labs   Lab 08/30/20  0838   TSH 3.390     All pertinent labs within the past 24 hours have been reviewed.    Significant Imaging:none      Assessment/Plan:      * SVT (supraventricular tachycardia)  8/31/20  no further episode, will start toprol and monitor. Continue tele and consult cards      Chest pain  8/31/20  still with complaints of chest pain this am. Will consult cards      Elevated troponin  8/31/20  consult cards likely due to SVT and elevated bp      Anxiety  8/31/20  mood is calm      GERD (gastroesophageal reflux disease)  8/31/20  continue meds        VTE Risk Mitigation (From admission, onward)         Ordered     enoxaparin injection 40 mg  Every 24 hours      08/30/20 1050     IP VTE LOW RISK PATIENT  Once      08/30/20 0305                Discharge Planning   IKE: 8/31/2020     Code Status: Full Code   Is the patient medically ready for discharge?:     Reason for patient still in hospital (select all that apply): Patient trending condition, Laboratory test and Treatment                     Lillie Ware NP  Department of Hospital Medicine   [facility]

## 2020-08-31 NOTE — PLAN OF CARE
08/31/20 1551   Discharge Assessment   Assessment Type Discharge Planning Assessment   Prior to hospitilization cognitive status: Alert/Oriented  (The patient is independent with ADLs. Still works and drives.)   Prior to hospitalization functional status: Independent   Current cognitive status: Alert/Oriented   Current Functional Status: Independent   Facility Arrived From: NA   Lives With child(neida), dependent;child(neida), adult   Able to Return to Prior Arrangements yes   Is patient able to care for self after discharge? Yes   Who are your caregiver(s) and their phone number(s)? Caregiver not neccessary.   Patient's perception of discharge disposition home or selfcare   Readmission Within the Last 30 Days no previous admission in last 30 days   Patient currently being followed by outpatient case management? No   Patient currently receives any other outside agency services? No   Equipment Currently Used at Home none   Do you have any problems affording any of your prescribed medications? No  (Patient just recieved a call that she was approved for medicaid.)   Is the patient taking medications as prescribed? yes   Does the patient have transportation home? Yes   Transportation Anticipated car, drives self;family or friend will provide   Dialysis Name and Scheduled days NA   Does the patient receive services at the [facility]? No   Discharge Plan A Home with family   Discharge Plan B Home with family   DME Needed Upon Discharge  none   Patient/Family in Agreement with Plan yes       IPAD  used. [id number]

## 2020-08-31 NOTE — SUBJECTIVE & OBJECTIVE
Interval History: see hospital course    Review of Systems   Constitutional: Positive for fatigue. Negative for activity change, appetite change, chills, diaphoresis, fever and unexpected weight change.   HENT: Negative for congestion, postnasal drip, sore throat and trouble swallowing.    Eyes: Negative for discharge.   Respiratory: Positive for chest tightness. Negative for cough, shortness of breath and wheezing.    Cardiovascular: Positive for chest pain. Negative for palpitations and leg swelling.   Gastrointestinal: Negative for abdominal pain, blood in stool, constipation, diarrhea, nausea and vomiting.   Genitourinary: Negative for decreased urine volume, difficulty urinating, dysuria, hematuria and urgency.   Musculoskeletal: Negative for arthralgias.   Skin: Negative for rash and wound.   Neurological: Negative for dizziness, speech difficulty, numbness and headaches.   Psychiatric/Behavioral: Negative for agitation and sleep disturbance.     Objective:     Vital Signs (Most Recent):  Temp: 98.2 °F (36.8 °C) (08/31/20 0645)  Pulse: 69 (08/31/20 0645)  Resp: 18 (08/31/20 0645)  BP: 114/71 (08/31/20 0645)  SpO2: 99 % (08/31/20 0645) Vital Signs (24h Range):  Temp:  [97.8 °F (36.6 °C)-98.7 °F (37.1 °C)] 98.2 °F (36.8 °C)  Pulse:  [66-82] 69  Resp:  [16-20] 18  SpO2:  [95 %-100 %] 99 %  BP: (114-134)/(64-80) 114/71     Weight: 83.9 kg (185 lb)  Body mass index is 34.96 kg/m².    Intake/Output Summary (Last 24 hours) at 8/31/2020 0947  Last data filed at 8/31/2020 0600  Gross per 24 hour   Intake 3960 ml   Output 2950 ml   Net 1010 ml      Physical Exam  Vitals signs and nursing note reviewed.   Constitutional:       Appearance: Normal appearance.   HENT:      Head: Normocephalic and atraumatic.      Right Ear: Hearing normal.      Left Ear: Hearing normal.      Nose: Nose normal.      Mouth/Throat:      Lips: Pink.      Mouth: Mucous membranes are moist.   Eyes:      Pupils: Pupils are equal, round, and  reactive to light.   Neck:      Musculoskeletal: Full passive range of motion without pain and normal range of motion.   Cardiovascular:      Rate and Rhythm: Normal rate and regular rhythm.      Pulses: Normal pulses.      Heart sounds: Normal heart sounds.   Pulmonary:      Effort: Pulmonary effort is normal.      Breath sounds: Normal breath sounds.   Abdominal:      General: Abdomen is flat. Bowel sounds are normal.      Palpations: Abdomen is soft.   Musculoskeletal: Normal range of motion.   Skin:     General: Skin is warm and dry.      Capillary Refill: Capillary refill takes less than 2 seconds.   Neurological:      General: No focal deficit present.      Mental Status: She is alert and oriented to person, place, and time. Mental status is at baseline.      Cranial Nerves: Cranial nerves are intact.   Psychiatric:         Mood and Affect: Mood normal.         Speech: Speech normal.         Behavior: Behavior normal.         Thought Content: Thought content normal.         Judgment: Judgment normal.         Significant Labs:   CBC:   Recent Labs   Lab 08/29/20  2155   WBC 8.22   HGB 14.4   HCT 42.8        CMP:   Recent Labs   Lab 08/29/20  2156      K 3.5      CO2 23   *   BUN 11   CREATININE 0.8   CALCIUM 8.9   PROT 8.1   ALBUMIN 3.9   BILITOT 0.6   ALKPHOS 71   AST 22   ALT 34   ANIONGAP 9   EGFRNONAA >60.0     Troponin:   Recent Labs   Lab 08/30/20  0316 08/30/20  0838 08/31/20  0518   TROPONINI 0.266* 0.235* 0.089*     TSH:   Recent Labs   Lab 08/30/20  0838   TSH 3.390     All pertinent labs within the past 24 hours have been reviewed.    Significant Imaging:none

## 2020-08-31 NOTE — CONSULTS
[facility]  Cardiology  Consult Note    Patient Name: Awa Vargas  Patient : 1970  MRN: [id number]  Admission Date: 2020  Hospital Length of Stay: 0 days  Code Status: Full Code   Attending Provider: Ioana Denney MD   Consulting Provider: JAMEL Oden  Primary Care Physician: [facility]  Principal Problem:SVT (supraventricular tachycardia)      Patient information was obtained from past medical records and ER records.     Consults  Subjective:     Chief Complaint:  Chest pain     HPI: Patient is a 51 y/o Gabonese speaking female with a history of HTN, gallstones and GERD. Presented to ER with complaints of weakness and chest pain.  ECG confirmed that patient was in SVT and was converted with adenosine.  She reportedly had prior episodes of tachycardia during pregnancy but never followed up with cardiology after that time.      Past Medical History:   Diagnosis Date    Calculus of gallbladder     Chronic gastritis 2017    GERD (gastroesophageal reflux disease)     H. pylori infection     Hyperbilirubinemia 6/15/2017    Hypertension     Obesity        Past Surgical History:   Procedure Laterality Date    APPENDECTOMY      CHOLECYSTECTOMY N/A 2017    WISDOM TOOTH EXTRACTION         Review of patient's allergies indicates:  No Known Allergies    No current facility-administered medications on file prior to encounter.      Current Outpatient Medications on File Prior to Encounter   Medication Sig    lisinopriL 10 MG tablet Take 10 mg by mouth once daily.    docusate sodium (COLACE) 100 MG capsule Take 1 capsule (100 mg total) by mouth 2 (two) times daily.    omeprazole (PRILOSEC) 40 MG capsule Take 1 capsule (40 mg total) by mouth once daily.    ondansetron (ZOFRAN) 4 MG tablet Take 1 tablet (4 mg total) by mouth every 8 (eight) hours as needed.    oxycodone-acetaminophen (PERCOCET) 5-325 mg per tablet Take 1-2 tablets by  mouth every 4 (four) hours as needed for Pain.     Family History     Problem Relation (Age of Onset)    Breast cancer Paternal Aunt, Paternal Uncle, Paternal Grandmother    Heart attack Father    Lung cancer Maternal Grandmother        Tobacco Use    Smoking status: Never Smoker    Smokeless tobacco: Never Used   Substance and Sexual Activity    Alcohol use: Yes    Drug use: No    Sexual activity: Not on file     Review of Systems   Constitutional: Negative.    HENT: Negative.    Eyes: Negative.    Respiratory: Negative for cough, shortness of breath and wheezing.    Cardiovascular: Positive for chest pain and palpitations. Negative for leg swelling.   Gastrointestinal: Negative.    Endocrine: Negative.    Genitourinary: Negative.    Musculoskeletal: Negative.    Skin: Negative.    Allergic/Immunologic: Negative.    Neurological: Negative.    Hematological: Negative.    Psychiatric/Behavioral: Negative.      Objective:     Vital Signs (Most Recent):  Temp: 98.3 °F (36.8 °C) (08/31/20 1103)  Pulse: 72 (08/31/20 1103)  Resp: 18 (08/31/20 1103)  BP: 112/64 (08/31/20 1103)  SpO2: 95 % (08/31/20 1103) Vital Signs (24h Range):  Temp:  [97.8 °F (36.6 °C)-98.7 °F (37.1 °C)] 98.3 °F (36.8 °C)  Pulse:  [66-80] 72  Resp:  [16-18] 18  SpO2:  [95 %-99 %] 95 %  BP: (112-124)/(64-72) 112/64     Weight: 83.9 kg (185 lb)  Body mass index is 34.96 kg/m².    SpO2: 95 %  O2 Device (Oxygen Therapy): room air      Intake/Output Summary (Last 24 hours) at 8/31/2020 1607  Last data filed at 8/31/2020 0600  Gross per 24 hour   Intake 3960 ml   Output 2950 ml   Net 1010 ml       Lines/Drains/Airways     Peripheral Intravenous Line                 Peripheral IV - Single Lumen 08/29/20 2154 20 G Left Hand 1 day                Physical Exam  Vitals signs and nursing note reviewed.   Constitutional:       General: She is not in acute distress.     Appearance: Normal appearance. She is normal weight.   HENT:      Head: Normocephalic and  atraumatic.   Eyes:      Extraocular Movements: Extraocular movements intact.      Pupils: Pupils are equal, round, and reactive to light.   Neck:      Musculoskeletal: Normal range of motion and neck supple.   Cardiovascular:      Rate and Rhythm: Normal rate and regular rhythm.      Heart sounds: Normal heart sounds, S1 normal and S2 normal. No murmur.   Pulmonary:      Effort: Pulmonary effort is normal. No accessory muscle usage or respiratory distress.      Breath sounds: Normal breath sounds. No wheezing, rhonchi or rales.   Abdominal:      General: Abdomen is flat. Bowel sounds are normal. There is no distension.      Palpations: Abdomen is soft.      Tenderness: There is no abdominal tenderness.   Musculoskeletal: Normal range of motion.      Right lower leg: No edema.      Left lower leg: No edema.   Skin:     General: Skin is warm and dry.   Neurological:      General: No focal deficit present.      Mental Status: She is alert and oriented to person, place, and time. Mental status is at baseline.      Motor: No weakness.   Psychiatric:         Mood and Affect: Mood normal.         Behavior: Behavior normal.         Thought Content: Thought content normal.         Judgment: Judgment normal.         Significant Labs:  All pertinent lab results from the last 24 hours have been reviewed.   Recent Lab Results  (Last 5 results in the past 72 hours)      08/31/20  0518   08/30/20  0838   08/30/20  0316   08/30/20  0056   08/29/20  2218        Benzodiazepines         Negative     Methadone metabolites         Negative     Phencyclidine         Negative     Amphetamine Screen, Ur         Negative     Appearance, UA         Clear     Barbiturate Screen, Ur         Negative     Bilirubin (UA)         Negative     Cocaine (Metab.)         Negative     Color, UA         Yellow     Creatinine, Random Ur         81.5  Comment:  The random urine reference ranges provided were established   for 24 hour urine collections.   No reference ranges exist for  random urine specimens.  Correlate clinically.       Glucose, UA         Negative     Ketones, UA         Negative     Leukocytes, UA         Negative     Magnesium   2.0           NITRITE UA         Negative     Occult Blood UA         Negative     Opiate Scrn, Ur         Negative     pH, UA         7.0     Protein, UA         Negative  Comment:  Recommend a 24 hour urine protein or a urine   protein/creatinine ratio if globulin induced proteinuria is  clinically suspected.       SARS-CoV-2 RNA, Amplification, Qual       Negative  Comment:  This test utilizes isothermal nucleic acid amplification   technology to detect the SARS-CoV-2 RdRp nucleic acid segment.   The analytical sensitivity (limit of detection) is 125 genome   equivalents/mL.   A POSITIVE result implies infection with the SARS-CoV-2 virus;  the patient is presumed to be contagious.    A NEGATIVE result means that SARS-CoV-2 nucleic acids are not  present above the limit of detection. A NEGATIVE result should be   treated as presumptive. It does not rule out the possibility of   COVID-19 and should not be the sole basis for treatment decisions.   If COVID-19 is strongly suspected based on clinical and exposure   history, re-testing using an alternate molecular assay should be   considered.   This test is only for use under the Food and Drug   Administration s Emergency Use Authorization (EUA).   Commercial kits are provided by [employer].   Performance characteristics of the EUA have been independently  verified by [facility] Department of  Pathology and Laboratory Medicine.   _________________________________________________________________  The ID NOW COVID-19 Letter of Authorization, along with the   authorized Fact Sheet for Healthcare Providers, the authorized Fact  Sheet for Patients, and authorized labeling are available on the FDA    website:  www.fda.gov/MedicalDevices/Safety/EmergencySituations/nea039297.htm         Specific Alkol, UA         1.015     Specimen UA         Urine, Clean Catch     Marijuana (THC) Metabolite         Negative     Toxicology Information         SEE COMMENT  Comment:  This screen includes the following classes of drugs at the   listed cut-off:  Benzodiazepines                  200 ng/ml  Methadone                        300 ng/ml  Cocaine metabolite               300 ng/ml  Opiates                         2000 ng/ml  Barbiturates                     200 ng/ml  Amphetamines                    1000 ng/ml  Marijuana metabs (THC)            50 ng/ml  Phencyclidine (PCP)               25 ng/ml  **Intended use : The UDS methods provide only a preliminary result.    A more   specific alternate chemical method must be used in order to obtain a   confirmed analytical result.  Gas chromatography mass spectrometry   (GC/MS)   is the preferred confirmatory method.  Clinical consideration and   professional judgement should be applied to any drug of abuse test   result.    Particularly when preliminary results are used.     ** Results:  Positive results are only preliminary and only suggest   the   sample may contain the analyte being tested.  Negative results   indicate that   the sample does not contain the analyte or the analyte is present in   concentrations below the cut-off level.       Troponin I 0.089  Comment:  The reference interval for Troponin I represents the 99th percentile   cutoff   for our facility and is consistent with 3rd generation assay   performance.  ATTENTION: The use of Biotin Supplements may interfere with this   assay.   0.235  Comment:  The reference interval for Troponin I represents the 99th percentile   cutoff   for our facility and is consistent with 3rd generation assay   performance.  ATTENTION: The use of Biotin Supplements may interfere with this   assay.   0.266  Comment:  The reference  interval for Troponin I represents the 99th percentile   cutoff   for our facility and is consistent with 3rd generation assay   performance.  ATTENTION: The use of Biotin Supplements may interfere with this   assay.           TSH   3.390  Comment:  ATTENTION: The use of Biotin Supplements may interfere with this   assay.             UROBILINOGEN UA         Negative                          Significant Imaging: X-Ray: CXR: X-Ray Chest 1 View (CXR): No results found for this visit on 08/29/20.  Imaging Results    None         MEDICATIONS:     Current Facility-Administered Medications:     docusate sodium capsule 100 mg, 100 mg, Oral, BID, Jones Anaya MD, 100 mg at 08/31/20 0844    enoxaparin injection 40 mg, 40 mg, Subcutaneous, Q24H, Jones Anaya MD, 40 mg at 08/30/20 1658    lisinopriL tablet 10 mg, 10 mg, Oral, Daily, Jones Anaya MD, 10 mg at 08/31/20 0845    metoprolol succinate (TOPROL-XL) 24 hr tablet 25 mg, 25 mg, Oral, Daily, Lillie Ware NP, 25 mg at 08/31/20 1452    ondansetron tablet 4 mg, 4 mg, Oral, Q6H PRN, Jones Anaya MD    pantoprazole injection 40 mg, 40 mg, Intravenous, Daily, Jones Anaya MD, 40 mg at 08/31/20 0933    sodium chloride 0.9% flush 10 mL, 10 mL, Intravenous, PRN, Jones Anaya MD    Continuing home medications.      Assessment and Plan:     Active Diagnoses:    Diagnosis Date Noted POA    PRINCIPAL PROBLEM:  SVT (supraventricular tachycardia) [I47.1] 08/30/2020 Yes    Chest pain [R07.9] 08/31/2020 Yes    Elevated troponin [R79.89] 08/31/2020 Yes    Anxiety [F41.9] 08/31/2020 Yes    GERD (gastroesophageal reflux disease) [K21.9]  Yes      Problems Resolved During this Admission:       VTE Risk Mitigation (From admission, onward)         Ordered     enoxaparin injection 40 mg  Every 24 hours      08/30/20 1050     IP VTE LOW RISK PATIENT  Once      08/30/20 0305                 1.  SVT:  Converted with adenosine in ER.   Rate stable with metoprolol succinate 25mg PO daily.  Continue at discharge.     2.  Chest pain with elevated troponin:  Trending down.  Likely secondary to SVT.  Will need follow up outpatient at [facility] for further stress testing/echocardiogram.     3.  Anxiety:  Per IM services     4.  GERD:  Continue home medications per IM       Thank you for your consult.          JAMEL Oden  Cardiology  [facility]  08/31/2020

## 2020-08-31 NOTE — HOSPITAL COURSE
8/31/20 LF patient via  reports that chest pain is improved but still present. States pain is midsternal and feels like pushing. She is still complaining of weakness and overall not feeling good

## 2020-08-31 NOTE — HPI
51 y/o [place] female with history of hypertension, gallstones, post cholecystectomy, gerd presented to the ed with complaints of worsening weakness and chest discomfort and further work up and eval in ed revealing that she was in svt and coverted medically with adneosine and still complaints of chest discomforta and troponins seems to be elevated and further admitted for  nstemi and psvt, still complaints of midsternal chest discomfort. Patient reports that she has similar episode with pregnancy and was instructed to follow up with cardiology however never did so.  She has been experiencing a lot of anxiety lately. Sees [facility]

## 2020-09-01 VITALS
HEIGHT: 61 IN | DIASTOLIC BLOOD PRESSURE: 71 MMHG | SYSTOLIC BLOOD PRESSURE: 115 MMHG | OXYGEN SATURATION: 97 % | BODY MASS INDEX: 34.93 KG/M2 | RESPIRATION RATE: 20 BRPM | WEIGHT: 185 LBS | HEART RATE: 78 BPM | TEMPERATURE: 98 F

## 2020-09-01 LAB
ALBUMIN SERPL BCP-MCNC: 3.3 G/DL (ref 3.5–5.2)
ALP SERPL-CCNC: 62 U/L (ref 55–135)
ALT SERPL W/O P-5'-P-CCNC: 34 U/L (ref 10–44)
ANION GAP SERPL CALC-SCNC: 4 MMOL/L (ref 8–16)
AST SERPL-CCNC: 16 U/L (ref 10–40)
BASOPHILS # BLD AUTO: 0.05 K/UL (ref 0–0.2)
BASOPHILS NFR BLD: 0.8 % (ref 0–1.9)
BILIRUB SERPL-MCNC: 0.5 MG/DL (ref 0.1–1)
BUN SERPL-MCNC: 11 MG/DL (ref 6–20)
CALCIUM SERPL-MCNC: 8.5 MG/DL (ref 8.7–10.5)
CHLORIDE SERPL-SCNC: 106 MMOL/L (ref 95–110)
CO2 SERPL-SCNC: 29 MMOL/L (ref 23–29)
CREAT SERPL-MCNC: 0.6 MG/DL (ref 0.5–1.4)
DIFFERENTIAL METHOD: ABNORMAL
EOSINOPHIL # BLD AUTO: 0.2 K/UL (ref 0–0.5)
EOSINOPHIL NFR BLD: 2.8 % (ref 0–8)
ERYTHROCYTE [DISTWIDTH] IN BLOOD BY AUTOMATED COUNT: 12.2 % (ref 11.5–14.5)
EST. GFR  (AFRICAN AMERICAN): >60 ML/MIN/1.73 M^2
EST. GFR  (NON AFRICAN AMERICAN): >60 ML/MIN/1.73 M^2
GLUCOSE SERPL-MCNC: 102 MG/DL (ref 70–110)
HCT VFR BLD AUTO: 41.7 % (ref 37–48.5)
HGB BLD-MCNC: 13.9 G/DL (ref 12–16)
IMM GRANULOCYTES # BLD AUTO: 0.05 K/UL (ref 0–0.04)
IMM GRANULOCYTES NFR BLD AUTO: 0.8 % (ref 0–0.5)
LYMPHOCYTES # BLD AUTO: 2.2 K/UL (ref 1–4.8)
LYMPHOCYTES NFR BLD: 33.7 % (ref 18–48)
MAGNESIUM SERPL-MCNC: 2.1 MG/DL (ref 1.6–2.6)
MCH RBC QN AUTO: 31.7 PG (ref 27–31)
MCHC RBC AUTO-ENTMCNC: 33.3 G/DL (ref 32–36)
MCV RBC AUTO: 95 FL (ref 82–98)
MONOCYTES # BLD AUTO: 0.5 K/UL (ref 0.3–1)
MONOCYTES NFR BLD: 8 % (ref 4–15)
NEUTROPHILS # BLD AUTO: 3.5 K/UL (ref 1.8–7.7)
NEUTROPHILS NFR BLD: 53.9 % (ref 38–73)
NRBC BLD-RTO: 0 /100 WBC
PLATELET # BLD AUTO: 279 K/UL (ref 150–350)
PMV BLD AUTO: 9.4 FL (ref 9.2–12.9)
POTASSIUM SERPL-SCNC: 4.1 MMOL/L (ref 3.5–5.1)
PROT SERPL-MCNC: 7 G/DL (ref 6–8.4)
RBC # BLD AUTO: 4.39 M/UL (ref 4–5.4)
SODIUM SERPL-SCNC: 139 MMOL/L (ref 136–145)
WBC # BLD AUTO: 6.52 K/UL (ref 3.9–12.7)

## 2020-09-01 PROCEDURE — C9113 INJ PANTOPRAZOLE SODIUM, VIA: HCPCS | Performed by: INTERNAL MEDICINE

## 2020-09-01 PROCEDURE — 36415 COLL VENOUS BLD VENIPUNCTURE: CPT

## 2020-09-01 PROCEDURE — 25000003 PHARM REV CODE 250: Performed by: INTERNAL MEDICINE

## 2020-09-01 PROCEDURE — 25000003 PHARM REV CODE 250: Performed by: NURSE PRACTITIONER

## 2020-09-01 PROCEDURE — 63600175 PHARM REV CODE 636 W HCPCS: Performed by: INTERNAL MEDICINE

## 2020-09-01 PROCEDURE — 85025 COMPLETE CBC W/AUTO DIFF WBC: CPT

## 2020-09-01 PROCEDURE — 83735 ASSAY OF MAGNESIUM: CPT

## 2020-09-01 PROCEDURE — 80053 COMPREHEN METABOLIC PANEL: CPT

## 2020-09-01 RX ORDER — ACETAMINOPHEN 500 MG
1000 TABLET ORAL EVERY 6 HOURS PRN
Status: DISCONTINUED | OUTPATIENT
Start: 2020-09-01 | End: 2020-09-01 | Stop reason: HOSPADM

## 2020-09-01 RX ORDER — METOPROLOL SUCCINATE 25 MG/1
25 TABLET, EXTENDED RELEASE ORAL DAILY
Qty: 30 TABLET | Refills: 0 | Status: SHIPPED | OUTPATIENT
Start: 2020-09-01 | End: 2024-02-01 | Stop reason: SDUPTHER

## 2020-09-01 RX ORDER — ACETAMINOPHEN 500 MG
1000 TABLET ORAL EVERY 6 HOURS PRN
Refills: 0
Start: 2020-09-01 | End: 2021-11-15

## 2020-09-01 RX ADMIN — LISINOPRIL 10 MG: 10 TABLET ORAL at 09:09

## 2020-09-01 RX ADMIN — DOCUSATE SODIUM 100 MG: 100 CAPSULE, LIQUID FILLED ORAL at 09:09

## 2020-09-01 RX ADMIN — PANTOPRAZOLE SODIUM 40 MG: 40 INJECTION, POWDER, LYOPHILIZED, FOR SOLUTION INTRAVENOUS at 09:09

## 2020-09-01 RX ADMIN — METOPROLOL SUCCINATE 25 MG: 25 TABLET, EXTENDED RELEASE ORAL at 09:09

## 2020-09-01 RX ADMIN — ACETAMINOPHEN 1000 MG: 500 TABLET ORAL at 09:09

## 2020-09-01 NOTE — DISCHARGE SUMMARY
[facility] Medicine  Discharge Summary      Patient Name: Awa Vargas  MRN: [id number]  Admission Date: 8/29/2020  Hospital Length of Stay: 1 days  Discharge Date and Time:  09/01/2020 9:16 AM  Attending Physician: Ioana Denney MD   Discharging Provider: Lillie Isidro NP  Primary Care Provider: [facility]      HPI:   49 y/o Georgian female with history of hypertension, gallstones, post cholecystectomy, gerd presented to the ed with complaints of worsening weakness and chest discomfort and further work up and eval in ed revealing that she was in svt and coverted medically with adneosine and still complaints of chest discomforta and troponins seems to be elevated and further admitted for  nstemi and psvt, still complaints of midsternal chest discomfort. Patient reports that she has similar episode with pregnancy and was instructed to follow up with cardiology however never did so.  She has been experiencing a lot of anxiety lately. Sees [facility] for PCP    * No surgery found *      Hospital Course:   8/31/20  patient via  reports that chest pain is improved but still present. States pain is midsternal and feels like pushing. She is still complaining of weakness and overall not feeling good     Consults:   Consults (From admission, onward)        Status Ordering Provider     Inpatient consult to Cardiology  Once     Provider:  Lorenzo Pond MD    Completed LILLIE ISIDRO          * SVT (supraventricular tachycardia)  8/31/20  no further episode, will start toprol and monitor. Continue tele and consult cards  9/1/20  seen per cardiology recommend follow up with outpatient cardiology at [facility] and continue beta blocker--no further episode after adenosine    Chest pain  8/31/20  still with complaints of chest pain this am. Will consult cards  9/1/20  see per cards likely secondary to SVT--resolved      Elevated  troponin  8/31/20  consult cards likely due to SVT and elevated bp  9/1/20  see per cards likely secondary to SVT and elevated blood pressure--resolved      Anxiety  8/31/20  mood is calm  8/31/20  mood is calm, continue meds per PCP and follow up      GERD (gastroesophageal reflux disease)  8/31/20  continue meds  9/1/20  continue meds for discharge        Final Active Diagnoses:    Diagnosis Date Noted POA    PRINCIPAL PROBLEM:  SVT (supraventricular tachycardia) [I47.1] 08/30/2020 Yes    Chest pain [R07.9] 08/31/2020 Yes    Elevated troponin [R79.89] 08/31/2020 Yes    Anxiety [F41.9] 08/31/2020 Yes    GERD (gastroesophageal reflux disease) [K21.9]  Yes      Problems Resolved During this Admission:       Discharged Condition: good    Disposition: Home or Self Care    Follow Up:  Follow-up Information     [facility] In 1 week.    Specialties: Psychology, Internal Medicine, Gynecology, Dental General Practice  Contact information:  [address] [place] [id number]  [phone]             [facility] In 1 week.    Specialty: Cardiology  Contact information:  [address] [facility] [address] [phone]  Additional information:  Please enter at the Main Hospital entrance and take the elevators to the 3rd floor. Thank you for choosing [facility].               Patient Instructions:      Ambulatory referral/consult to Cardiology   Standing Status: Future   Referral Priority: Routine Referral Type: Consultation   Referral Reason: Specialty Services Required   Requested Specialty: Cardiology   Number of Visits Requested: 1     Diet Cardiac     Activity as tolerated       Significant Diagnostic Studies: Labs:   CMP   Recent Labs   Lab 09/01/20  0357      K 4.1      CO2 29      BUN 11   CREATININE 0.6   CALCIUM 8.5*   PROT 7.0   ALBUMIN 3.3*   BILITOT 0.5   ALKPHOS 62   AST 16   ALT 34   ANIONGAP 4*   ESTGFRAFRICA >60.0    EGFRNONAA >60.0   , CBC   Recent Labs   Lab 09/01/20  0357   WBC 6.52   HGB 13.9   HCT 41.7      , Troponin   Recent Labs   Lab 08/30/20  0316 08/30/20  0838 08/31/20  0518   TROPONINI 0.266* 0.235* 0.089*    and All labs within the past 24 hours have been reviewed  Radiology: chest xray   No acute finding detected.     Pending Diagnostic Studies:     None         Medications:  Reconciled Home Medications:      Medication List      START taking these medications    acetaminophen 500 MG tablet  Commonly known as: TYLENOL  Take 2 tablets (1,000 mg total) by mouth every 6 (six) hours as needed.     metoprolol succinate 25 MG 24 hr tablet  Commonly known as: TOPROL-XL  Take 1 tablet (25 mg total) by mouth once daily.        CONTINUE taking these medications    docusate sodium 100 MG capsule  Commonly known as: COLACE  Take 1 capsule (100 mg total) by mouth 2 (two) times daily.     lisinopriL 10 MG tablet  Take 10 mg by mouth once daily.     omeprazole 40 MG capsule  Commonly known as: PRILOSEC  Take 1 capsule (40 mg total) by mouth once daily.     ondansetron 4 MG tablet  Commonly known as: ZOFRAN  Take 1 tablet (4 mg total) by mouth every 8 (eight) hours as needed.        STOP taking these medications    oxyCODONE-acetaminophen 5-325 mg per tablet  Commonly known as: PERCOCET            Indwelling Lines/Drains at time of discharge:   Lines/Drains/Airways     None                 Time spent on the discharge of patient: 30 minutes  Patient was seen and examined on the date of discharge and determined to be suitable for discharge.         Lillie Ware NP  Department of Hospital Medicine  [facility]

## 2020-09-01 NOTE — ASSESSMENT & PLAN NOTE
8/31/20 LF still with complaints of chest pain this am. Will consult cards  9/1/20 LF see per cards likely secondary to SVT--resolved

## 2020-09-01 NOTE — PLAN OF CARE
09/01/20 0936   Final Note   Assessment Type Final Discharge Note   Anticipated Discharge Disposition Home   Hospital Follow Up  Appt(s) scheduled?   (Referral sent to [facility]. Spoke to Virginie who says the nurse will call patient with appointment. Attempted to call [facility] twice but there was no answer and no voicemail.)       Spoke to the patient using the IPAD . (Sadia [id number]). Told the patient that a referral to [facility] was made and that the nurse would call her with appointment. Verified phone number. I told her if she does not receive a call she needs to call them. She verbalizes understanding. I told her I attempted to make a follow up at [facility] for one week but there was no answer. She says she thinks the office is closed but she will make her own appointment tomorrow. Answered all questions. No other discharge needs.

## 2020-09-01 NOTE — ASSESSMENT & PLAN NOTE
8/31/20  no further episode, will start toprol and monitor. Continue tele and consult cards  9/1/20 LF seen per cardiology recommend follow up with outpatient cardiology at [facility] and continue beta blocker--no further episode after adenosine

## 2020-09-01 NOTE — ASSESSMENT & PLAN NOTE
8/31/20 LF consult cards likely due to SVT and elevated bp  9/1/20 LF see per cards likely secondary to SVT and elevated blood pressure--resolved

## 2021-05-10 ENCOUNTER — PATIENT MESSAGE (OUTPATIENT)
Dept: RESEARCH | Facility: HOSPITAL | Age: 51
End: 2021-05-10

## 2021-05-10 DIAGNOSIS — Z12.31 ENCOUNTER FOR SCREENING MAMMOGRAM FOR MALIGNANT NEOPLASM OF BREAST: Primary | ICD-10-CM

## 2021-11-15 ENCOUNTER — OFFICE VISIT (OUTPATIENT)
Dept: SURGERY | Facility: CLINIC | Age: 51
End: 2021-11-15
Payer: MEDICAID

## 2021-11-15 VITALS — WEIGHT: 189.19 LBS | BODY MASS INDEX: 33.52 KG/M2 | HEIGHT: 63 IN

## 2021-11-15 DIAGNOSIS — Z01.818 PRE-OP TESTING: ICD-10-CM

## 2021-11-15 DIAGNOSIS — Z12.11 SCREEN FOR COLON CANCER: Primary | ICD-10-CM

## 2021-11-15 PROCEDURE — 99203 OFFICE O/P NEW LOW 30 MIN: CPT | Mod: S$PBB,,, | Performed by: STUDENT IN AN ORGANIZED HEALTH CARE EDUCATION/TRAINING PROGRAM

## 2021-11-15 PROCEDURE — 99999 PR PBB SHADOW E&M-EST. PATIENT-LVL V: CPT | Mod: PBBFAC,,, | Performed by: STUDENT IN AN ORGANIZED HEALTH CARE EDUCATION/TRAINING PROGRAM

## 2021-11-15 PROCEDURE — 99999 PR PBB SHADOW E&M-EST. PATIENT-LVL V: ICD-10-PCS | Mod: PBBFAC,,, | Performed by: STUDENT IN AN ORGANIZED HEALTH CARE EDUCATION/TRAINING PROGRAM

## 2021-11-15 PROCEDURE — 99215 OFFICE O/P EST HI 40 MIN: CPT | Mod: PBBFAC | Performed by: STUDENT IN AN ORGANIZED HEALTH CARE EDUCATION/TRAINING PROGRAM

## 2021-11-15 PROCEDURE — 99203 PR OFFICE/OUTPT VISIT, NEW, LEVL III, 30-44 MIN: ICD-10-PCS | Mod: S$PBB,,, | Performed by: STUDENT IN AN ORGANIZED HEALTH CARE EDUCATION/TRAINING PROGRAM

## 2021-11-15 RX ORDER — SODIUM, POTASSIUM,MAG SULFATES 17.5-3.13G
1 SOLUTION, RECONSTITUTED, ORAL ORAL 2 TIMES DAILY
Qty: 1 KIT | Refills: 0 | Status: SHIPPED | OUTPATIENT
Start: 2021-11-15 | End: 2021-11-15

## 2021-11-15 RX ORDER — SODIUM, POTASSIUM,MAG SULFATES 17.5-3.13G
1 SOLUTION, RECONSTITUTED, ORAL ORAL DAILY
Qty: 1 KIT | Refills: 0 | Status: SHIPPED | OUTPATIENT
Start: 2021-11-15 | End: 2021-11-17

## 2021-11-15 RX ORDER — SODIUM CHLORIDE 9 MG/ML
INJECTION, SOLUTION INTRAVENOUS CONTINUOUS
Status: CANCELLED | OUTPATIENT
Start: 2021-11-15

## 2021-11-19 ENCOUNTER — HOSPITAL ENCOUNTER (OUTPATIENT)
Dept: RADIOLOGY | Facility: HOSPITAL | Age: 51
Discharge: HOME | End: 2021-11-19
Attending: NURSE PRACTITIONER
Payer: MEDICAID

## 2021-11-19 VITALS — WEIGHT: 189 LBS | HEIGHT: 63 IN | BODY MASS INDEX: 33.49 KG/M2

## 2021-11-19 DIAGNOSIS — Z12.31 ENCOUNTER FOR SCREENING MAMMOGRAM FOR MALIGNANT NEOPLASM OF BREAST: ICD-10-CM

## 2021-11-19 PROCEDURE — 77067 SCR MAMMO BI INCL CAD: CPT | Mod: TC

## 2021-11-30 ENCOUNTER — HOSPITAL ENCOUNTER (OUTPATIENT)
Dept: PREADMISSION TESTING | Facility: HOSPITAL | Age: 51
Discharge: HOME | End: 2021-11-30
Attending: STUDENT IN AN ORGANIZED HEALTH CARE EDUCATION/TRAINING PROGRAM
Payer: MEDICAID

## 2021-11-30 ENCOUNTER — HOSPITAL ENCOUNTER (OUTPATIENT)
Dept: RADIOLOGY | Facility: HOSPITAL | Age: 51
Discharge: HOME | End: 2021-11-30
Attending: STUDENT IN AN ORGANIZED HEALTH CARE EDUCATION/TRAINING PROGRAM
Payer: MEDICAID

## 2021-11-30 ENCOUNTER — HOSPITAL ENCOUNTER (OUTPATIENT)
Dept: PULMONOLOGY | Facility: HOSPITAL | Age: 51
Discharge: HOME | End: 2021-11-30
Attending: STUDENT IN AN ORGANIZED HEALTH CARE EDUCATION/TRAINING PROGRAM
Payer: MEDICAID

## 2021-11-30 VITALS — HEIGHT: 63 IN | WEIGHT: 183 LBS | BODY MASS INDEX: 32.43 KG/M2

## 2021-11-30 DIAGNOSIS — Z12.11 SCREEN FOR COLON CANCER: ICD-10-CM

## 2021-11-30 PROCEDURE — 93005 ELECTROCARDIOGRAM TRACING: CPT

## 2021-11-30 PROCEDURE — 71045 X-RAY EXAM CHEST 1 VIEW: CPT | Mod: TC

## 2021-11-30 PROCEDURE — 93010 ELECTROCARDIOGRAM REPORT: CPT | Mod: ,,, | Performed by: INTERNAL MEDICINE

## 2021-11-30 PROCEDURE — 93010 EKG 12-LEAD: ICD-10-PCS | Mod: ,,, | Performed by: INTERNAL MEDICINE

## 2021-11-30 RX ORDER — ATORVASTATIN CALCIUM 40 MG/1
TABLET, FILM COATED ORAL
COMMUNITY
Start: 2021-11-10 | End: 2023-08-16

## 2021-11-30 RX ORDER — MELOXICAM 7.5 MG/1
TABLET ORAL
COMMUNITY
Start: 2021-11-10

## 2021-12-14 ENCOUNTER — LAB VISIT (OUTPATIENT)
Dept: LAB | Facility: HOSPITAL | Age: 51
End: 2021-12-14
Attending: STUDENT IN AN ORGANIZED HEALTH CARE EDUCATION/TRAINING PROGRAM
Payer: MEDICAID

## 2021-12-14 ENCOUNTER — ANESTHESIA EVENT (OUTPATIENT)
Dept: ENDOSCOPY | Facility: HOSPITAL | Age: 51
End: 2021-12-14
Payer: MEDICAID

## 2021-12-14 DIAGNOSIS — Z01.818 PRE-OP TESTING: ICD-10-CM

## 2021-12-14 LAB — SARS-COV-2 RNA RESP QL NAA+PROBE: NOT DETECTED

## 2021-12-14 PROCEDURE — U0002 COVID-19 LAB TEST NON-CDC: HCPCS | Performed by: STUDENT IN AN ORGANIZED HEALTH CARE EDUCATION/TRAINING PROGRAM

## 2021-12-15 ENCOUNTER — HOSPITAL ENCOUNTER (OUTPATIENT)
Facility: HOSPITAL | Age: 51
Discharge: HOME | End: 2021-12-15
Attending: STUDENT IN AN ORGANIZED HEALTH CARE EDUCATION/TRAINING PROGRAM | Admitting: STUDENT IN AN ORGANIZED HEALTH CARE EDUCATION/TRAINING PROGRAM
Payer: MEDICAID

## 2021-12-15 ENCOUNTER — ANESTHESIA (OUTPATIENT)
Dept: ENDOSCOPY | Facility: HOSPITAL | Age: 51
End: 2021-12-15
Payer: MEDICAID

## 2021-12-15 VITALS
OXYGEN SATURATION: 100 % | SYSTOLIC BLOOD PRESSURE: 133 MMHG | RESPIRATION RATE: 20 BRPM | DIASTOLIC BLOOD PRESSURE: 88 MMHG | TEMPERATURE: 98 F | HEART RATE: 69 BPM

## 2021-12-15 DIAGNOSIS — K57.90 DIVERTICULOSIS: Primary | ICD-10-CM

## 2021-12-15 DIAGNOSIS — Z01.818 PRE-OP TESTING: ICD-10-CM

## 2021-12-15 LAB — B-HCG UR QL: NEGATIVE

## 2021-12-15 PROCEDURE — G0121 COLON CA SCRN NOT HI RSK IND: HCPCS | Performed by: STUDENT IN AN ORGANIZED HEALTH CARE EDUCATION/TRAINING PROGRAM

## 2021-12-15 PROCEDURE — 00812 ANES LWR INTST SCR COLSC: CPT | Performed by: STUDENT IN AN ORGANIZED HEALTH CARE EDUCATION/TRAINING PROGRAM

## 2021-12-15 PROCEDURE — 37000008 HC ANESTHESIA 1ST 15 MINUTES: Performed by: STUDENT IN AN ORGANIZED HEALTH CARE EDUCATION/TRAINING PROGRAM

## 2021-12-15 PROCEDURE — 45378 PR COLONOSCOPY,DIAGNOSTIC: ICD-10-PCS | Mod: ,,, | Performed by: STUDENT IN AN ORGANIZED HEALTH CARE EDUCATION/TRAINING PROGRAM

## 2021-12-15 PROCEDURE — 45378 DIAGNOSTIC COLONOSCOPY: CPT | Mod: ,,, | Performed by: STUDENT IN AN ORGANIZED HEALTH CARE EDUCATION/TRAINING PROGRAM

## 2021-12-15 PROCEDURE — 37000009 HC ANESTHESIA EA ADD 15 MINS: Performed by: STUDENT IN AN ORGANIZED HEALTH CARE EDUCATION/TRAINING PROGRAM

## 2021-12-15 PROCEDURE — 25000003 PHARM REV CODE 250: Performed by: STUDENT IN AN ORGANIZED HEALTH CARE EDUCATION/TRAINING PROGRAM

## 2021-12-15 PROCEDURE — 81025 URINE PREGNANCY TEST: CPT | Performed by: STUDENT IN AN ORGANIZED HEALTH CARE EDUCATION/TRAINING PROGRAM

## 2021-12-15 RX ORDER — PROPOFOL 10 MG/ML
VIAL (ML) INTRAVENOUS
Status: DISCONTINUED | OUTPATIENT
Start: 2021-12-15 | End: 2021-12-15

## 2021-12-15 RX ORDER — SODIUM CHLORIDE 9 MG/ML
INJECTION, SOLUTION INTRAVENOUS CONTINUOUS
Status: DISCONTINUED | OUTPATIENT
Start: 2021-12-15 | End: 2021-12-15 | Stop reason: HOSPADM

## 2021-12-15 RX ORDER — LIDOCAINE HYDROCHLORIDE 10 MG/ML
INJECTION, SOLUTION INTRAVENOUS
Status: DISCONTINUED | OUTPATIENT
Start: 2021-12-15 | End: 2021-12-15

## 2021-12-15 RX ADMIN — SODIUM CHLORIDE: 0.9 INJECTION, SOLUTION INTRAVENOUS at 10:12

## 2021-12-15 RX ADMIN — Medication 50 MG: at 10:12

## 2021-12-15 RX ADMIN — LIDOCAINE HYDROCHLORIDE 50 MG: 10 INJECTION, SOLUTION INTRAVENOUS at 10:12

## 2022-08-01 ENCOUNTER — OFFICE VISIT (OUTPATIENT)
Dept: OBSTETRICS AND GYNECOLOGY | Facility: CLINIC | Age: 52
End: 2022-08-01
Payer: MEDICAID

## 2022-08-01 VITALS
WEIGHT: 177 LBS | HEIGHT: 63 IN | DIASTOLIC BLOOD PRESSURE: 98 MMHG | SYSTOLIC BLOOD PRESSURE: 139 MMHG | BODY MASS INDEX: 31.36 KG/M2

## 2022-08-01 DIAGNOSIS — Z12.4 SCREENING FOR MALIGNANT NEOPLASM OF THE CERVIX: Primary | ICD-10-CM

## 2022-08-01 DIAGNOSIS — Z12.31 SCREENING MAMMOGRAM, ENCOUNTER FOR: ICD-10-CM

## 2022-08-01 DIAGNOSIS — Z30.09 FAMILY PLANNING: ICD-10-CM

## 2022-08-01 PROCEDURE — 3008F PR BODY MASS INDEX (BMI) DOCUMENTED: ICD-10-PCS | Mod: CPTII,,, | Performed by: OBSTETRICS & GYNECOLOGY

## 2022-08-01 PROCEDURE — 1159F MED LIST DOCD IN RCRD: CPT | Mod: CPTII,,, | Performed by: OBSTETRICS & GYNECOLOGY

## 2022-08-01 PROCEDURE — 99999 PR PBB SHADOW E&M-EST. PATIENT-LVL III: CPT | Mod: PBBFAC,,, | Performed by: OBSTETRICS & GYNECOLOGY

## 2022-08-01 PROCEDURE — 3008F BODY MASS INDEX DOCD: CPT | Mod: CPTII,,, | Performed by: OBSTETRICS & GYNECOLOGY

## 2022-08-01 PROCEDURE — 99386 PREV VISIT NEW AGE 40-64: CPT | Mod: S$PBB,,, | Performed by: OBSTETRICS & GYNECOLOGY

## 2022-08-01 PROCEDURE — 3080F DIAST BP >= 90 MM HG: CPT | Mod: CPTII,,, | Performed by: OBSTETRICS & GYNECOLOGY

## 2022-08-01 PROCEDURE — 1159F PR MEDICATION LIST DOCUMENTED IN MEDICAL RECORD: ICD-10-PCS | Mod: CPTII,,, | Performed by: OBSTETRICS & GYNECOLOGY

## 2022-08-01 PROCEDURE — 1160F PR REVIEW ALL MEDS BY PRESCRIBER/CLIN PHARMACIST DOCUMENTED: ICD-10-PCS | Mod: CPTII,,, | Performed by: OBSTETRICS & GYNECOLOGY

## 2022-08-01 PROCEDURE — 3075F PR MOST RECENT SYSTOLIC BLOOD PRESS GE 130-139MM HG: ICD-10-PCS | Mod: CPTII,,, | Performed by: OBSTETRICS & GYNECOLOGY

## 2022-08-01 PROCEDURE — 99386 PR PREVENTIVE VISIT,NEW,40-64: ICD-10-PCS | Mod: S$PBB,,, | Performed by: OBSTETRICS & GYNECOLOGY

## 2022-08-01 PROCEDURE — 99999 PR PBB SHADOW E&M-EST. PATIENT-LVL III: ICD-10-PCS | Mod: PBBFAC,,, | Performed by: OBSTETRICS & GYNECOLOGY

## 2022-08-01 PROCEDURE — 1160F RVW MEDS BY RX/DR IN RCRD: CPT | Mod: CPTII,,, | Performed by: OBSTETRICS & GYNECOLOGY

## 2022-08-01 PROCEDURE — 3080F PR MOST RECENT DIASTOLIC BLOOD PRESSURE >= 90 MM HG: ICD-10-PCS | Mod: CPTII,,, | Performed by: OBSTETRICS & GYNECOLOGY

## 2022-08-01 PROCEDURE — 99213 OFFICE O/P EST LOW 20 MIN: CPT | Mod: PBBFAC | Performed by: OBSTETRICS & GYNECOLOGY

## 2022-08-01 PROCEDURE — 3075F SYST BP GE 130 - 139MM HG: CPT | Mod: CPTII,,, | Performed by: OBSTETRICS & GYNECOLOGY

## 2022-08-01 RX ORDER — FLUTICASONE PROPIONATE 50 MCG
2 SPRAY, SUSPENSION (ML) NASAL DAILY
COMMUNITY
Start: 2022-05-05

## 2022-08-01 NOTE — PROGRESS NOTES
"  Chief Complaint      Chief Complaint   Patient presents with    Well Woman     Annual with pap, Last pap 2 years ago normal       History of Present Illness      Awa Vargas is a 52 y.o. female who presents for annual exam and denies abnormal Paps or mammograms. She is due for both and declined STD screening.    LMP:  Patient's last menstrual period was 07/15/2022.    PAP:  8/1/2022    Vital Signs:     Vital Signs  BP: (!) 139/98  Height and Weight  Height: 5' 3" (160 cm)  Weight: 80.3 kg (177 lb)  BSA (Calculated - sq m): 1.89 sq meters  BMI (Calculated): 31.4  Weight in (lb) to have BMI = 25: 140.8]    Past Medical History:  Past Medical History:   Diagnosis Date    Calculus of gallbladder     Chronic gastritis 8/28/2017    GERD (gastroesophageal reflux disease)     H. pylori infection     Hyperbilirubinemia 6/15/2017    Hypertension     Obesity     SOB (shortness of breath)        Past Surgical History:   has a past surgical history that includes Appendectomy; [place] tooth extraction; Cholecystectomy (N/A, 08/29/2017); and Colonoscopy (N/A, 12/15/2021).    Family History:  family history includes Breast cancer in her father, paternal aunt, paternal grandmother, and paternal uncle; Heart attack (age of onset: 62) in her father; Lung cancer in her maternal grandmother; No Known Problems in her daughter, maternal aunt, maternal grandfather, maternal uncle, mother, paternal grandfather, and sister.     Social History:  Social History     Tobacco Use    Smoking status: Never Smoker    Smokeless tobacco: Never Used   Substance Use Topics    Alcohol use: Yes     Comment: socially     Drug use: No       I personally reviewed all past medical, surgical, social and family history.    Review of Systems   Constitutional: Negative for chills and fever.   HENT: Negative for sore throat.    Respiratory: Negative for cough and shortness of breath.    Cardiovascular: Negative for chest pain. "   Gastrointestinal: Negative for constipation, diarrhea, nausea and vomiting.   Endocrine: Negative for cold intolerance and heat intolerance.   Genitourinary: Negative for dysuria, pelvic pain, urgency, vaginal bleeding and vaginal discharge.   Musculoskeletal: Negative for arthralgias.   Neurological: Negative for syncope and headaches.   Psychiatric/Behavioral: Negative for suicidal ideas.      Physical Exam    Medications:  Current Outpatient Medications   Medication Sig Dispense Refill    escitalopram oxalate (LEXAPRO) 10 MG tablet TOME MARTHA TABLETA TODOS LOS D AS      fluticasone propionate (FLONASE) 50 mcg/actuation nasal spray 2 sprays by Each Nostril route once daily.      meloxicam (MOBIC) 7.5 MG tablet       atorvastatin (LIPITOR) 40 MG tablet       metoprolol succinate (TOPROL-XL) 25 MG 24 hr tablet Take 1 tablet (25 mg total) by mouth once daily. 30 tablet 0     No current facility-administered medications for this visit.       Allergies:  Review of patient's allergies indicates:  No Known Allergies    Health Maintenance:    There is no immunization history on file for this patient.   Health Maintenance   Topic Date Due    Lipid Panel  Never done    TETANUS VACCINE  Never done    Mammogram  11/19/2022    Hepatitis C Screening  Completed        Physical Exam       Assessment/Plan     Awa Vargas is a 52 y.o.female with:    1. Screening for malignant neoplasm of the cervix  - Liquid-Based Pap Smear, Screening  - HPV High Risk Genotypes, PCR     - Annual exam and Pap performed  - Discussed Pap every 3 years  - Will order Mirena IUD and discussed menopause age and common symptoms  - Will call when IUD arrives    Ronny Velasquez MD

## 2022-08-09 LAB
HPV16+18+H RISK 12 DNA CVX-IMP: NORMAL
LIQUID BASED PAP SMEAR, SCREENING: NORMAL

## 2022-08-12 ENCOUNTER — PROCEDURE VISIT (OUTPATIENT)
Dept: OBSTETRICS AND GYNECOLOGY | Facility: CLINIC | Age: 52
End: 2022-08-12
Payer: MEDICAID

## 2022-08-12 VITALS
WEIGHT: 177 LBS | BODY MASS INDEX: 31.36 KG/M2 | SYSTOLIC BLOOD PRESSURE: 143 MMHG | DIASTOLIC BLOOD PRESSURE: 95 MMHG | HEIGHT: 63 IN | HEART RATE: 85 BPM

## 2022-08-12 DIAGNOSIS — Z32.02 NEGATIVE PREGNANCY TEST: Primary | ICD-10-CM

## 2022-08-12 LAB
B-HCG UR QL: NEGATIVE
CTP QC/QA: YES

## 2022-08-12 PROCEDURE — 81025 URINE PREGNANCY TEST: CPT | Mod: PBBFAC | Performed by: OBSTETRICS & GYNECOLOGY

## 2022-08-17 ENCOUNTER — PROCEDURE VISIT (OUTPATIENT)
Dept: OBSTETRICS AND GYNECOLOGY | Facility: CLINIC | Age: 52
End: 2022-08-17
Payer: MEDICAID

## 2022-09-19 ENCOUNTER — PROCEDURE VISIT (OUTPATIENT)
Dept: OBSTETRICS AND GYNECOLOGY | Facility: CLINIC | Age: 52
End: 2022-09-19
Payer: MEDICAID

## 2022-09-19 DIAGNOSIS — Z30.431 IUD CHECK UP: Primary | ICD-10-CM

## 2022-09-19 PROCEDURE — 76856 US EXAM PELVIC COMPLETE: CPT | Mod: PBBFAC | Performed by: OBSTETRICS & GYNECOLOGY

## 2022-09-19 PROCEDURE — 76856 US OB/GYN EXTENDED PROCEDURE (VIEWPOINT): ICD-10-PCS | Mod: 26,S$PBB,, | Performed by: OBSTETRICS & GYNECOLOGY

## 2022-09-28 ENCOUNTER — OFFICE VISIT (OUTPATIENT)
Dept: OBSTETRICS AND GYNECOLOGY | Facility: CLINIC | Age: 52
End: 2022-09-28
Payer: MEDICAID

## 2022-09-28 VITALS
WEIGHT: 175 LBS | SYSTOLIC BLOOD PRESSURE: 138 MMHG | HEIGHT: 63 IN | BODY MASS INDEX: 31.01 KG/M2 | DIASTOLIC BLOOD PRESSURE: 88 MMHG

## 2022-09-28 DIAGNOSIS — T83.32XA MALPOSITIONED INTRAUTERINE DEVICE (IUD), INITIAL ENCOUNTER: Primary | ICD-10-CM

## 2022-09-28 DIAGNOSIS — E66.9 OBESITY (BMI 30-39.9): ICD-10-CM

## 2022-09-28 PROCEDURE — 1160F PR REVIEW ALL MEDS BY PRESCRIBER/CLIN PHARMACIST DOCUMENTED: ICD-10-PCS | Mod: CPTII,,, | Performed by: OBSTETRICS & GYNECOLOGY

## 2022-09-28 PROCEDURE — 3008F PR BODY MASS INDEX (BMI) DOCUMENTED: ICD-10-PCS | Mod: CPTII,,, | Performed by: OBSTETRICS & GYNECOLOGY

## 2022-09-28 PROCEDURE — 99999 PR PBB SHADOW E&M-EST. PATIENT-LVL III: CPT | Mod: PBBFAC,,, | Performed by: OBSTETRICS & GYNECOLOGY

## 2022-09-28 PROCEDURE — 3079F PR MOST RECENT DIASTOLIC BLOOD PRESSURE 80-89 MM HG: ICD-10-PCS | Mod: CPTII,,, | Performed by: OBSTETRICS & GYNECOLOGY

## 2022-09-28 PROCEDURE — 3008F BODY MASS INDEX DOCD: CPT | Mod: CPTII,,, | Performed by: OBSTETRICS & GYNECOLOGY

## 2022-09-28 PROCEDURE — 99213 OFFICE O/P EST LOW 20 MIN: CPT | Mod: S$PBB,,, | Performed by: OBSTETRICS & GYNECOLOGY

## 2022-09-28 PROCEDURE — 3075F PR MOST RECENT SYSTOLIC BLOOD PRESS GE 130-139MM HG: ICD-10-PCS | Mod: CPTII,,, | Performed by: OBSTETRICS & GYNECOLOGY

## 2022-09-28 PROCEDURE — 3075F SYST BP GE 130 - 139MM HG: CPT | Mod: CPTII,,, | Performed by: OBSTETRICS & GYNECOLOGY

## 2022-09-28 PROCEDURE — 99999 PR PBB SHADOW E&M-EST. PATIENT-LVL III: ICD-10-PCS | Mod: PBBFAC,,, | Performed by: OBSTETRICS & GYNECOLOGY

## 2022-09-28 PROCEDURE — 99213 OFFICE O/P EST LOW 20 MIN: CPT | Mod: PBBFAC | Performed by: OBSTETRICS & GYNECOLOGY

## 2022-09-28 PROCEDURE — 1159F MED LIST DOCD IN RCRD: CPT | Mod: CPTII,,, | Performed by: OBSTETRICS & GYNECOLOGY

## 2022-09-28 PROCEDURE — 1159F PR MEDICATION LIST DOCUMENTED IN MEDICAL RECORD: ICD-10-PCS | Mod: CPTII,,, | Performed by: OBSTETRICS & GYNECOLOGY

## 2022-09-28 PROCEDURE — 1160F RVW MEDS BY RX/DR IN RCRD: CPT | Mod: CPTII,,, | Performed by: OBSTETRICS & GYNECOLOGY

## 2022-09-28 PROCEDURE — 99213 PR OFFICE/OUTPT VISIT, EST, LEVL III, 20-29 MIN: ICD-10-PCS | Mod: S$PBB,,, | Performed by: OBSTETRICS & GYNECOLOGY

## 2022-09-28 PROCEDURE — 3079F DIAST BP 80-89 MM HG: CPT | Mod: CPTII,,, | Performed by: OBSTETRICS & GYNECOLOGY

## 2022-09-28 RX ORDER — PHENTERMINE HYDROCHLORIDE 37.5 MG/1
37.5 TABLET ORAL
Qty: 30 TABLET | Refills: 0 | Status: SHIPPED | OUTPATIENT
Start: 2022-09-28 | End: 2022-10-28

## 2022-09-28 NOTE — PROGRESS NOTES
Subjective:       Patient ID: Awa Vargas is a 52 y.o. female.    Chief Complaint:  Follow-up (F/u from us iud not in place)      History of Present Illness  Patient had US last visit that showed her IUD is in the lower uterine segment around the transition between cervix and uterus but reports her menses has slowed and not as heavy or last as long. She also reports occasional hot flashes which is a new event and started a few months ago.  She tells me today she is starting to exercise and wants something to curb her appetite and help her lose weight.    Menstrual History:  OB History          4    Para   4    Term   4            AB        Living             SAB        IAB        Ectopic        Multiple        Live Births                    Menarche age:   Patient's last menstrual period was 2022.         Review of Systems  Review of Systems   Constitutional:  Negative for chills and fever.   HENT:  Negative for sore throat.    Respiratory:  Negative for cough and shortness of breath.    Cardiovascular:  Negative for chest pain.   Gastrointestinal:  Negative for constipation, diarrhea, nausea and vomiting.   Endocrine: Positive for heat intolerance. Negative for cold intolerance.   Genitourinary:  Negative for dysuria, pelvic pain, urgency, vaginal bleeding and vaginal discharge.   Musculoskeletal:  Negative for arthralgias.   Neurological:  Negative for syncope and headaches.   Psychiatric/Behavioral:  Negative for suicidal ideas.          Objective:      Physical Exam  Vitals and nursing note reviewed.   Constitutional:       Appearance: Normal appearance.   HENT:      Head: Normocephalic and atraumatic.   Pulmonary:      Effort: Pulmonary effort is normal.   Musculoskeletal:      Cervical back: Normal range of motion.   Skin:     General: Skin is warm and dry.   Neurological:      General: No focal deficit present.      Mental Status: She is alert and oriented to person, place, and  time.      Gait: Gait normal.   Psychiatric:         Mood and Affect: Mood normal.         Behavior: Behavior normal.         Judgment: Judgment normal.           Assessment:        1. Malpositioned intrauterine device (IUD), initial encounter    2. Obesity (BMI 30-39.9)                Plan:         Awa was seen today for follow-up.    Diagnoses and all orders for this visit:    Malpositioned intrauterine device (IUD), initial encounter  Comments:  At the transition between cervix and uterus but still in the lower uterine segment    Obesity (BMI 30-39.9)    - Discussed proper low calorie diet and exercise and will start meds for weight loss  - Will keep IUD in place for now and watch to see if bleeding continues to decrease or she goes through menopause

## 2022-11-02 ENCOUNTER — OFFICE VISIT (OUTPATIENT)
Dept: OBSTETRICS AND GYNECOLOGY | Facility: CLINIC | Age: 52
End: 2022-11-02
Payer: MEDICAID

## 2022-11-02 VITALS
SYSTOLIC BLOOD PRESSURE: 122 MMHG | BODY MASS INDEX: 29.59 KG/M2 | DIASTOLIC BLOOD PRESSURE: 88 MMHG | HEIGHT: 63 IN | WEIGHT: 167 LBS

## 2022-11-02 DIAGNOSIS — T83.32XA MALPOSITIONED INTRAUTERINE DEVICE (IUD), INITIAL ENCOUNTER: ICD-10-CM

## 2022-11-02 DIAGNOSIS — E66.9 OBESITY (BMI 30-39.9): Primary | ICD-10-CM

## 2022-11-02 PROCEDURE — 99213 OFFICE O/P EST LOW 20 MIN: CPT | Mod: S$PBB,,, | Performed by: OBSTETRICS & GYNECOLOGY

## 2022-11-02 PROCEDURE — 1160F PR REVIEW ALL MEDS BY PRESCRIBER/CLIN PHARMACIST DOCUMENTED: ICD-10-PCS | Mod: CPTII,,, | Performed by: OBSTETRICS & GYNECOLOGY

## 2022-11-02 PROCEDURE — 99999 PR PBB SHADOW E&M-EST. PATIENT-LVL III: CPT | Mod: PBBFAC,,, | Performed by: OBSTETRICS & GYNECOLOGY

## 2022-11-02 PROCEDURE — 3074F PR MOST RECENT SYSTOLIC BLOOD PRESSURE < 130 MM HG: ICD-10-PCS | Mod: CPTII,,, | Performed by: OBSTETRICS & GYNECOLOGY

## 2022-11-02 PROCEDURE — 1159F PR MEDICATION LIST DOCUMENTED IN MEDICAL RECORD: ICD-10-PCS | Mod: CPTII,,, | Performed by: OBSTETRICS & GYNECOLOGY

## 2022-11-02 PROCEDURE — 99213 OFFICE O/P EST LOW 20 MIN: CPT | Mod: PBBFAC | Performed by: OBSTETRICS & GYNECOLOGY

## 2022-11-02 PROCEDURE — 3079F DIAST BP 80-89 MM HG: CPT | Mod: CPTII,,, | Performed by: OBSTETRICS & GYNECOLOGY

## 2022-11-02 PROCEDURE — 99999 PR PBB SHADOW E&M-EST. PATIENT-LVL III: ICD-10-PCS | Mod: PBBFAC,,, | Performed by: OBSTETRICS & GYNECOLOGY

## 2022-11-02 PROCEDURE — 3074F SYST BP LT 130 MM HG: CPT | Mod: CPTII,,, | Performed by: OBSTETRICS & GYNECOLOGY

## 2022-11-02 PROCEDURE — 3079F PR MOST RECENT DIASTOLIC BLOOD PRESSURE 80-89 MM HG: ICD-10-PCS | Mod: CPTII,,, | Performed by: OBSTETRICS & GYNECOLOGY

## 2022-11-02 PROCEDURE — 3008F PR BODY MASS INDEX (BMI) DOCUMENTED: ICD-10-PCS | Mod: CPTII,,, | Performed by: OBSTETRICS & GYNECOLOGY

## 2022-11-02 PROCEDURE — 1160F RVW MEDS BY RX/DR IN RCRD: CPT | Mod: CPTII,,, | Performed by: OBSTETRICS & GYNECOLOGY

## 2022-11-02 PROCEDURE — 99213 PR OFFICE/OUTPT VISIT, EST, LEVL III, 20-29 MIN: ICD-10-PCS | Mod: S$PBB,,, | Performed by: OBSTETRICS & GYNECOLOGY

## 2022-11-02 PROCEDURE — 1159F MED LIST DOCD IN RCRD: CPT | Mod: CPTII,,, | Performed by: OBSTETRICS & GYNECOLOGY

## 2022-11-02 PROCEDURE — 3008F BODY MASS INDEX DOCD: CPT | Mod: CPTII,,, | Performed by: OBSTETRICS & GYNECOLOGY

## 2022-11-02 RX ORDER — PHENTERMINE HYDROCHLORIDE 37.5 MG/1
37.5 TABLET ORAL
Qty: 30 TABLET | Refills: 0 | Status: SHIPPED | OUTPATIENT
Start: 2022-11-02 | End: 2022-12-02 | Stop reason: SDUPTHER

## 2022-11-02 NOTE — PROGRESS NOTES
Subjective:       Patient ID: Awa Vargas is a 52 y.o. female.    Chief Complaint:  Follow-up (F/u misplaced iud. States she is still bleeding and cramping)      History of Present Illness  Patient with malpositioned IUD at the junction between the cervix and lower uterine segment  and reports her bleeding is minimal and just started 2 days ago and wants to keep the IUD in place for now. She is exercising and doing well on the Adipex as well and wants to continue.    Menstrual History:  OB History          4    Para   4    Term   4            AB        Living             SAB        IAB        Ectopic        Multiple        Live Births                    Menarche age:   No LMP recorded.         Review of Systems  Review of Systems   Constitutional:  Negative for chills and fever.   HENT:  Negative for sore throat.    Respiratory:  Negative for cough and shortness of breath.    Cardiovascular:  Negative for chest pain.   Gastrointestinal:  Negative for constipation, diarrhea, nausea and vomiting.   Endocrine: Negative for cold intolerance and heat intolerance.   Genitourinary:  Negative for dysuria, pelvic pain, urgency, vaginal bleeding and vaginal discharge.   Musculoskeletal:  Negative for arthralgias.   Neurological:  Negative for syncope and headaches.   Psychiatric/Behavioral:  Negative for suicidal ideas.          Objective:      Physical Exam  Vitals and nursing note reviewed.   Constitutional:       Appearance: Normal appearance.   HENT:      Head: Normocephalic and atraumatic.   Pulmonary:      Effort: Pulmonary effort is normal.   Musculoskeletal:      Cervical back: Normal range of motion.   Skin:     General: Skin is warm and dry.   Neurological:      General: No focal deficit present.      Mental Status: She is alert and oriented to person, place, and time.      Gait: Gait normal.   Psychiatric:         Mood and Affect: Mood normal.         Behavior: Behavior normal.          Judgment: Judgment normal.           Assessment:      No diagnosis found.            Plan:         There are no diagnoses linked to this encounter.

## 2022-12-02 ENCOUNTER — OFFICE VISIT (OUTPATIENT)
Dept: OBSTETRICS AND GYNECOLOGY | Facility: CLINIC | Age: 52
End: 2022-12-02
Payer: MEDICAID

## 2022-12-02 VITALS
DIASTOLIC BLOOD PRESSURE: 88 MMHG | BODY MASS INDEX: 29.23 KG/M2 | WEIGHT: 165 LBS | HEIGHT: 63 IN | SYSTOLIC BLOOD PRESSURE: 138 MMHG

## 2022-12-02 DIAGNOSIS — E66.9 OBESITY (BMI 30-39.9): ICD-10-CM

## 2022-12-02 DIAGNOSIS — E66.3 OVERWEIGHT (BMI 25.0-29.9): Primary | ICD-10-CM

## 2022-12-02 PROCEDURE — 99213 OFFICE O/P EST LOW 20 MIN: CPT | Mod: S$PBB,,, | Performed by: OBSTETRICS & GYNECOLOGY

## 2022-12-02 PROCEDURE — 99999 PR PBB SHADOW E&M-EST. PATIENT-LVL II: CPT | Mod: PBBFAC,,, | Performed by: OBSTETRICS & GYNECOLOGY

## 2022-12-02 PROCEDURE — 3075F SYST BP GE 130 - 139MM HG: CPT | Mod: CPTII,,, | Performed by: OBSTETRICS & GYNECOLOGY

## 2022-12-02 PROCEDURE — 1160F RVW MEDS BY RX/DR IN RCRD: CPT | Mod: CPTII,,, | Performed by: OBSTETRICS & GYNECOLOGY

## 2022-12-02 PROCEDURE — 3079F DIAST BP 80-89 MM HG: CPT | Mod: CPTII,,, | Performed by: OBSTETRICS & GYNECOLOGY

## 2022-12-02 PROCEDURE — 3008F PR BODY MASS INDEX (BMI) DOCUMENTED: ICD-10-PCS | Mod: CPTII,,, | Performed by: OBSTETRICS & GYNECOLOGY

## 2022-12-02 PROCEDURE — 1159F PR MEDICATION LIST DOCUMENTED IN MEDICAL RECORD: ICD-10-PCS | Mod: CPTII,,, | Performed by: OBSTETRICS & GYNECOLOGY

## 2022-12-02 PROCEDURE — 99213 PR OFFICE/OUTPT VISIT, EST, LEVL III, 20-29 MIN: ICD-10-PCS | Mod: S$PBB,,, | Performed by: OBSTETRICS & GYNECOLOGY

## 2022-12-02 PROCEDURE — 1159F MED LIST DOCD IN RCRD: CPT | Mod: CPTII,,, | Performed by: OBSTETRICS & GYNECOLOGY

## 2022-12-02 PROCEDURE — 3008F BODY MASS INDEX DOCD: CPT | Mod: CPTII,,, | Performed by: OBSTETRICS & GYNECOLOGY

## 2022-12-02 PROCEDURE — 3079F PR MOST RECENT DIASTOLIC BLOOD PRESSURE 80-89 MM HG: ICD-10-PCS | Mod: CPTII,,, | Performed by: OBSTETRICS & GYNECOLOGY

## 2022-12-02 PROCEDURE — 1160F PR REVIEW ALL MEDS BY PRESCRIBER/CLIN PHARMACIST DOCUMENTED: ICD-10-PCS | Mod: CPTII,,, | Performed by: OBSTETRICS & GYNECOLOGY

## 2022-12-02 PROCEDURE — 99212 OFFICE O/P EST SF 10 MIN: CPT | Mod: PBBFAC | Performed by: OBSTETRICS & GYNECOLOGY

## 2022-12-02 PROCEDURE — 99999 PR PBB SHADOW E&M-EST. PATIENT-LVL II: ICD-10-PCS | Mod: PBBFAC,,, | Performed by: OBSTETRICS & GYNECOLOGY

## 2022-12-02 PROCEDURE — 3075F PR MOST RECENT SYSTOLIC BLOOD PRESS GE 130-139MM HG: ICD-10-PCS | Mod: CPTII,,, | Performed by: OBSTETRICS & GYNECOLOGY

## 2022-12-02 RX ORDER — PHENTERMINE HYDROCHLORIDE 37.5 MG/1
37.5 TABLET ORAL
Qty: 30 TABLET | Refills: 0 | Status: SHIPPED | OUTPATIENT
Start: 2022-12-02 | End: 2023-01-04 | Stop reason: SDUPTHER

## 2022-12-02 NOTE — PROGRESS NOTES
Subjective:       Patient ID: Awa Vargas is a 52 y.o. female.    Chief Complaint:  Weight Check (Wm 3 pt down 2 lbs)      History of Present Illness  Patient here for  and wants to continue medication. She lost 2 lbs and has now started to increase her exercise and feels better.     Menstrual History:  OB History          4    Para   4    Term   4            AB        Living             SAB        IAB        Ectopic        Multiple        Live Births                    Menarche age:   No LMP recorded.         Review of Systems  Review of Systems   Constitutional:  Negative for chills and fever.   HENT:  Negative for sore throat.    Respiratory:  Negative for cough and shortness of breath.    Cardiovascular:  Negative for chest pain.   Gastrointestinal:  Negative for constipation, diarrhea, nausea and vomiting.   Endocrine: Negative for cold intolerance and heat intolerance.   Genitourinary:  Negative for dysuria, pelvic pain, urgency, vaginal bleeding and vaginal discharge.   Musculoskeletal:  Negative for arthralgias.   Neurological:  Negative for syncope and headaches.   Psychiatric/Behavioral:  Negative for suicidal ideas.          Objective:      Physical Exam  Vitals and nursing note reviewed.   Constitutional:       Appearance: Normal appearance.   HENT:      Head: Normocephalic and atraumatic.   Pulmonary:      Effort: Pulmonary effort is normal.   Musculoskeletal:      Cervical back: Normal range of motion.   Skin:     General: Skin is warm and dry.   Neurological:      General: No focal deficit present.      Mental Status: She is alert and oriented to person, place, and time.      Gait: Gait normal.   Psychiatric:         Mood and Affect: Mood normal.         Behavior: Behavior normal.         Judgment: Judgment normal.           Assessment:        1. Overweight (BMI 25.0-29.9)    2. Obesity (BMI 30-39.9)                Plan:         Awa was seen today for weight  check.    Diagnoses and all orders for this visit:    Overweight (BMI 25.0-29.9)    Obesity (BMI 30-39.9)  -     phentermine (ADIPEX-P) 37.5 mg tablet; Take 1 tablet (37.5 mg total) by mouth before breakfast.       - Discussed increasing exercise and calorie control  - RTC 30 days

## 2023-01-03 ENCOUNTER — HOSPITAL ENCOUNTER (EMERGENCY)
Facility: HOSPITAL | Age: 53
Discharge: HOME | End: 2023-01-03
Attending: EMERGENCY MEDICINE
Payer: MEDICAID

## 2023-01-03 VITALS
OXYGEN SATURATION: 99 % | BODY MASS INDEX: 30.36 KG/M2 | TEMPERATURE: 98 F | DIASTOLIC BLOOD PRESSURE: 67 MMHG | RESPIRATION RATE: 18 BRPM | SYSTOLIC BLOOD PRESSURE: 147 MMHG | HEIGHT: 62 IN | HEART RATE: 89 BPM | WEIGHT: 165 LBS

## 2023-01-03 DIAGNOSIS — K57.90 DIVERTICULOSIS: Primary | ICD-10-CM

## 2023-01-03 LAB
B-HCG UR QL: NEGATIVE
BACTERIA #/AREA URNS HPF: NEGATIVE /HPF
BILIRUB UR QL STRIP: NEGATIVE
CLARITY UR: ABNORMAL
COLOR UR: YELLOW
GLUCOSE UR QL STRIP: NEGATIVE
HGB UR QL STRIP: NEGATIVE
HYALINE CASTS #/AREA URNS LPF: 5.1 /LPF
KETONES UR QL STRIP: ABNORMAL
LEUKOCYTE ESTERASE UR QL STRIP: NEGATIVE
MICROSCOPIC COMMENT: ABNORMAL
NITRITE UR QL STRIP: NEGATIVE
PH UR STRIP: 7 [PH] (ref 5–8)
PROT UR QL STRIP: NEGATIVE
RBC #/AREA URNS HPF: 16 /HPF (ref 0–4)
SP GR UR STRIP: >=1.03 (ref 1–1.03)
SQUAMOUS #/AREA URNS HPF: 7 /HPF
URN SPEC COLLECT METH UR: ABNORMAL
UROBILINOGEN UR STRIP-ACNC: 1 EU/DL
WBC #/AREA URNS HPF: 2 /HPF (ref 0–5)

## 2023-01-03 PROCEDURE — 81000 URINALYSIS NONAUTO W/SCOPE: CPT

## 2023-01-03 PROCEDURE — 81025 URINE PREGNANCY TEST: CPT

## 2023-01-03 PROCEDURE — 25000003 PHARM REV CODE 250

## 2023-01-03 PROCEDURE — 99284 EMERGENCY DEPT VISIT MOD MDM: CPT | Mod: 25

## 2023-01-03 RX ORDER — HYDROCODONE BITARTRATE AND ACETAMINOPHEN 5; 325 MG/1; MG/1
1 TABLET ORAL
Status: COMPLETED | OUTPATIENT
Start: 2023-01-03 | End: 2023-01-03

## 2023-01-03 RX ORDER — HYDROCODONE BITARTRATE AND ACETAMINOPHEN 5; 325 MG/1; MG/1
1 TABLET ORAL EVERY 6 HOURS PRN
Qty: 9 TABLET | Refills: 0 | Status: SHIPPED | OUTPATIENT
Start: 2023-01-03 | End: 2023-08-16

## 2023-01-03 RX ADMIN — HYDROCODONE BITARTRATE AND ACETAMINOPHEN 1 TABLET: 5; 325 TABLET ORAL at 05:01

## 2023-01-03 NOTE — Clinical Note
"Awa"Hansel Vargas was seen and treated in our emergency department on 1/3/2023.  She may return to work on 01/04/2023.       If you have any questions or concerns, please don't hesitate to call.      Chica Garcia RN    "

## 2023-01-03 NOTE — Clinical Note
"Awa"Hansel Vargas was seen and treated in our emergency department on 1/3/2023.  She may return to work on 01/05/2023.       If you have any questions or concerns, please don't hesitate to call.      Chica Garcia RN    "

## 2023-01-04 ENCOUNTER — OFFICE VISIT (OUTPATIENT)
Dept: OBSTETRICS AND GYNECOLOGY | Facility: CLINIC | Age: 53
End: 2023-01-04
Payer: MEDICAID

## 2023-01-04 VITALS
SYSTOLIC BLOOD PRESSURE: 142 MMHG | WEIGHT: 161 LBS | DIASTOLIC BLOOD PRESSURE: 94 MMHG | HEIGHT: 62 IN | BODY MASS INDEX: 29.63 KG/M2

## 2023-01-04 DIAGNOSIS — E66.9 OBESITY (BMI 30-39.9): ICD-10-CM

## 2023-01-04 DIAGNOSIS — E66.3 OVERWEIGHT (BMI 25.0-29.9): Primary | ICD-10-CM

## 2023-01-04 PROCEDURE — 3077F SYST BP >= 140 MM HG: CPT | Mod: CPTII,,, | Performed by: OBSTETRICS & GYNECOLOGY

## 2023-01-04 PROCEDURE — 99999 PR PBB SHADOW E&M-EST. PATIENT-LVL II: CPT | Mod: PBBFAC,,, | Performed by: OBSTETRICS & GYNECOLOGY

## 2023-01-04 PROCEDURE — 3008F PR BODY MASS INDEX (BMI) DOCUMENTED: ICD-10-PCS | Mod: CPTII,,, | Performed by: OBSTETRICS & GYNECOLOGY

## 2023-01-04 PROCEDURE — 3080F PR MOST RECENT DIASTOLIC BLOOD PRESSURE >= 90 MM HG: ICD-10-PCS | Mod: CPTII,,, | Performed by: OBSTETRICS & GYNECOLOGY

## 2023-01-04 PROCEDURE — 3077F PR MOST RECENT SYSTOLIC BLOOD PRESSURE >= 140 MM HG: ICD-10-PCS | Mod: CPTII,,, | Performed by: OBSTETRICS & GYNECOLOGY

## 2023-01-04 PROCEDURE — 1160F RVW MEDS BY RX/DR IN RCRD: CPT | Mod: CPTII,,, | Performed by: OBSTETRICS & GYNECOLOGY

## 2023-01-04 PROCEDURE — 1159F MED LIST DOCD IN RCRD: CPT | Mod: CPTII,,, | Performed by: OBSTETRICS & GYNECOLOGY

## 2023-01-04 PROCEDURE — 99212 OFFICE O/P EST SF 10 MIN: CPT | Mod: PBBFAC | Performed by: OBSTETRICS & GYNECOLOGY

## 2023-01-04 PROCEDURE — 3080F DIAST BP >= 90 MM HG: CPT | Mod: CPTII,,, | Performed by: OBSTETRICS & GYNECOLOGY

## 2023-01-04 PROCEDURE — 99999 PR PBB SHADOW E&M-EST. PATIENT-LVL II: ICD-10-PCS | Mod: PBBFAC,,, | Performed by: OBSTETRICS & GYNECOLOGY

## 2023-01-04 PROCEDURE — 99213 OFFICE O/P EST LOW 20 MIN: CPT | Mod: S$PBB,,, | Performed by: OBSTETRICS & GYNECOLOGY

## 2023-01-04 PROCEDURE — 1159F PR MEDICATION LIST DOCUMENTED IN MEDICAL RECORD: ICD-10-PCS | Mod: CPTII,,, | Performed by: OBSTETRICS & GYNECOLOGY

## 2023-01-04 PROCEDURE — 99213 PR OFFICE/OUTPT VISIT, EST, LEVL III, 20-29 MIN: ICD-10-PCS | Mod: S$PBB,,, | Performed by: OBSTETRICS & GYNECOLOGY

## 2023-01-04 PROCEDURE — 3008F BODY MASS INDEX DOCD: CPT | Mod: CPTII,,, | Performed by: OBSTETRICS & GYNECOLOGY

## 2023-01-04 PROCEDURE — 1160F PR REVIEW ALL MEDS BY PRESCRIBER/CLIN PHARMACIST DOCUMENTED: ICD-10-PCS | Mod: CPTII,,, | Performed by: OBSTETRICS & GYNECOLOGY

## 2023-01-04 RX ORDER — PHENTERMINE HYDROCHLORIDE 37.5 MG/1
37.5 TABLET ORAL
Qty: 30 TABLET | Refills: 0 | Status: SHIPPED | OUTPATIENT
Start: 2023-01-04 | End: 2023-01-04 | Stop reason: SDUPTHER

## 2023-01-04 NOTE — PROGRESS NOTES
Subjective:       Patient ID: Awa Vargas is a 52 y.o. female.    Chief Complaint:  Weight Check (Wm3 pt down 4 lbs)      History of Present Illness  Patient here for WM and lost another 4 lbs since her last visit and wants to continue the medication. She also nees another order for her mammogram.    Menstrual History:  OB History          4    Para   4    Term   4            AB        Living             SAB        IAB        Ectopic        Multiple        Live Births                    Menarche age:   Patient's last menstrual period was 2022 (approximate).         Review of Systems  Review of Systems        Objective:      Physical Exam        Assessment:        1. Overweight (BMI 25.0-29.9)                Plan:         Awa was seen today for weight check.    Diagnoses and all orders for this visit:    Overweight (BMI 25.0-29.9)    - Discussed increasing exercise  - RTC 30 days

## 2023-01-04 NOTE — DISCHARGE INSTRUCTIONS
Your seen today for abdominal pain.  CT scan showed diverticulosis, which is an inflammation in her colon.  There does not appear to be any signs of infection.  Start with a liquid diet progressed to a bland diet to give your colon time to rest.  Avoid fried and fatty foods.  Avoid foods with nuts or seeds in it.  Follow-up with your primary care provider if your symptoms do not improve.      Richards visto hoy para el dolor abdominal. La tomografía computarizada mostró diverticulosis, que es aakash inflamación en richards colon. No parece león signos de infección. Comience con aakash dieta líquida que progrese a aakash dieta blanda para que richards colon tenga tiempo para descansar. Evite los alimentos fritos y grasos. Evite los alimentos que contengan ean secos o semillas. Sacha un seguimiento con richards proveedor de atención primaria si nay síntomas no mejoran.

## 2023-01-04 NOTE — ED PROVIDER NOTES
Encounter Date: 1/3/2023       History     Chief Complaint   Patient presents with    Abdominal Pain     Patient states she started with abdominal pain x 1 hour ago. Patient complains of constant stabbing pain. Patient complains of vomiting.   Patient went to [place] urgent care and was advised to come to ED for further.      52-year-old female to ED with complaints of sudden onset of abdominal pain 1 hour ago.  She reports pain is in her suprapubic/periumbilical region.  Pain is constant.  She attempted to go to urgent care and was told to come to ED. no medication was taking prior to her coming year.  She also reports urinary frequency.  Last menstrual period was 2 weeks ago.  She denies any fevers.  She denies any vaginal discharge.    The history is provided by the patient.   Review of patient's allergies indicates:  No Known Allergies  Past Medical History:   Diagnosis Date    Calculus of gallbladder     Chronic gastritis 08/28/2017    H. pylori infection     Hyperbilirubinemia 06/15/2017    Hypertension     Obesity     SOB (shortness of breath)      Past Surgical History:   Procedure Laterality Date    APPENDECTOMY      CHOLECYSTECTOMY N/A 08/29/2017    COLONOSCOPY N/A 12/15/2021    Procedure: COLONOSCOPY;  Surgeon: Leigh East MD;  Location: [facility];  Service: General;  Laterality: N/A;  3  0700    WISDOM TOOTH EXTRACTION       Family History   Problem Relation Age of Onset    No Known Problems Mother     Heart attack Father 62    Breast cancer Father     Lung cancer Maternal Grandmother     Breast cancer Paternal Aunt     Breast cancer Paternal Uncle     Breast cancer Paternal Grandmother     No Known Problems Sister     No Known Problems Daughter     No Known Problems Maternal Aunt     No Known Problems Maternal Uncle     No Known Problems Maternal Grandfather     No Known Problems Paternal Grandfather     Ovarian cancer Neg Hx     BRCA 1/2 Neg Hx      Social History     Tobacco Use    Smoking status:  Never    Smokeless tobacco: Never   Substance Use Topics    Alcohol use: Yes     Comment: socially     Drug use: No     Review of Systems   Constitutional:  Negative for fatigue and fever.   HENT:  Negative for congestion.    Eyes: Negative.    Respiratory:  Negative for shortness of breath and wheezing.    Cardiovascular:  Negative for chest pain and palpitations.   Gastrointestinal:  Positive for abdominal pain, nausea and vomiting.   Endocrine: Negative.    Genitourinary:  Positive for dysuria. Negative for vaginal discharge and vaginal pain.   Musculoskeletal:  Negative for myalgias and neck pain.   Skin:  Negative for rash and wound.   Allergic/Immunologic: Negative.    Hematological: Negative.    Psychiatric/Behavioral: Negative.       Physical Exam     Initial Vitals [01/03/23 1726]   BP Pulse Resp Temp SpO2   (!) 154/101 101 20 98 °F (36.7 °C) 97 %      MAP       --         Physical Exam    Nursing note and vitals reviewed.  Constitutional: She appears well-developed and well-nourished.   HENT:   Head: Normocephalic and atraumatic.   Eyes: EOM are normal.   Neck: Neck supple.   Normal range of motion.  Cardiovascular:  Normal rate and regular rhythm.           Pulmonary/Chest: Breath sounds normal. No respiratory distress. She has no wheezes.   Abdominal: Abdomen is soft. There is abdominal tenderness.   No right CVA tenderness.  No left CVA tenderness.       Musculoskeletal:         General: Normal range of motion.      Cervical back: Normal range of motion and neck supple.     Neurological: She is alert and oriented to person, place, and time.   Skin: Skin is warm and dry.   Psychiatric: She has a normal mood and affect. Thought content normal.       ED Course   Procedures  Labs Reviewed   URINALYSIS, REFLEX TO URINE CULTURE - Abnormal; Notable for the following components:       Result Value    Appearance, UA Cloudy (*)     Specific Gravity, UA >=1.030 (*)     Ketones, UA 1+ (*)     All other components  within normal limits    Narrative:     Preferred Collection Type->Urine, Clean Catch  Specimen Source->Urine   URINALYSIS MICROSCOPIC - Abnormal; Notable for the following components:    RBC, UA 16 (*)     Hyaline Casts, UA 5.1 (*)     All other components within normal limits    Narrative:     Preferred Collection Type->Urine, Clean Catch  Specimen Source->Urine   PREGNANCY TEST, URINE RAPID    Narrative:     Specimen Source->Urine          Imaging Results              CT Abdomen Pelvis  Without Contrast (In process)                      Medications   HYDROcodone-acetaminophen 5-325 mg per tablet 1 tablet (1 tablet Oral Given 1/3/23 1755)     Medical Decision Making:   Clinical Tests:   Lab Tests: Ordered and Reviewed  Radiological Study: Ordered and Reviewed  ED Management:  52-year-old female to ED for above complaints.  Her pain was sudden in onset.  I obtained a urinalysis which indicated 16 red blood cells without any signs of infection.  UPT was negative.  She did have some mild tenderness noted on and palpation of her abdomen.  CT scan was obtained.  Are CT showed colonic diverticulosis without evidence of diverticulitis.  She was given Norco while waiting for her workup.  She reported a 3/10 pain after Norco was given.  She was given diet recommendations and instructed to follow-up with her primary care provider for further management.  I also sent her home with a short course of her Norco does.  She was stable for discharge.                        Clinical Impression:   Final diagnoses:  [K57.90] Diverticulosis (Primary)        ED Disposition Condition    Discharge Stable          ED Prescriptions       Medication Sig Dispense Start Date End Date Auth. Provider    HYDROcodone-acetaminophen (NORCO) 5-325 mg per tablet Take 1 tablet by mouth every 6 (six) hours as needed for Pain. 9 tablet 1/3/2023 -- Dominick Yanez NP          Follow-up Information       Follow up With Specialties Details Why Contact  Info    [facility] Psychology, Internal Medicine, Gynecology, Dental General Practice In 2 days  [address] [facility] [address]  [phone]               Dominick Yanez NP  01/03/23 2055

## 2023-01-11 ENCOUNTER — HOSPITAL ENCOUNTER (OUTPATIENT)
Dept: RADIOLOGY | Facility: HOSPITAL | Age: 53
Discharge: HOME | End: 2023-01-11
Attending: OBSTETRICS & GYNECOLOGY
Payer: MEDICAID

## 2023-01-11 VITALS — BODY MASS INDEX: 29.63 KG/M2 | HEIGHT: 62 IN | WEIGHT: 161 LBS

## 2023-01-11 DIAGNOSIS — Z12.31 SCREENING MAMMOGRAM, ENCOUNTER FOR: ICD-10-CM

## 2023-01-11 PROCEDURE — 77067 SCR MAMMO BI INCL CAD: CPT | Mod: TC

## 2023-02-08 ENCOUNTER — CLINICAL SUPPORT (OUTPATIENT)
Dept: OBSTETRICS AND GYNECOLOGY | Facility: CLINIC | Age: 53
End: 2023-02-08
Payer: MEDICAID

## 2023-02-08 VITALS
WEIGHT: 156 LBS | BODY MASS INDEX: 28.71 KG/M2 | HEIGHT: 62 IN | DIASTOLIC BLOOD PRESSURE: 92 MMHG | SYSTOLIC BLOOD PRESSURE: 130 MMHG

## 2023-02-08 DIAGNOSIS — E66.3 OVERWEIGHT (BMI 25.0-29.9): Primary | ICD-10-CM

## 2023-02-08 DIAGNOSIS — Z76.89 ENCOUNTER FOR WEIGHT MANAGEMENT: ICD-10-CM

## 2023-02-08 PROCEDURE — 99999 PR PBB SHADOW E&M-EST. PATIENT-LVL II: ICD-10-PCS | Mod: PBBFAC,,,

## 2023-02-08 PROCEDURE — 99212 OFFICE O/P EST SF 10 MIN: CPT | Mod: PBBFAC

## 2023-02-08 PROCEDURE — 99212 PR OFFICE/OUTPT VISIT, EST, LEVL II, 10-19 MIN: ICD-10-PCS | Mod: S$PBB,,, | Performed by: OBSTETRICS & GYNECOLOGY

## 2023-02-08 PROCEDURE — 99212 OFFICE O/P EST SF 10 MIN: CPT | Mod: S$PBB,,, | Performed by: OBSTETRICS & GYNECOLOGY

## 2023-02-08 PROCEDURE — 99999 PR PBB SHADOW E&M-EST. PATIENT-LVL II: CPT | Mod: PBBFAC,,,

## 2023-02-09 RX ORDER — PHENTERMINE HYDROCHLORIDE 37.5 MG/1
37.5 TABLET ORAL
Qty: 30 TABLET | Refills: 0 | Status: SHIPPED | OUTPATIENT
Start: 2023-02-09 | End: 2023-03-08 | Stop reason: SDUPTHER

## 2023-02-09 NOTE — PROGRESS NOTES
"Awa desires refill of adipex.   Her current prescription was begun on 3 months ago And she desires to continue medication.  She has lost 5 lb in the last month.   Review of systems: No chest pain, shortness of breath, headache, constipation  Body mass index is 28.53 kg/m².  BP (!) 130/92   Ht 5' 2" (1.575 m)   Wt 70.8 kg (156 lb)   BMI 28.53 kg/m²     Exam:  Abdomen soft, nontender.  Extremities no cyanosis, clubbing, edema  Assessment:  Overweight, weight management.    Plan:  Refill Adipex.  Return to clinic in 1 month.  Diet and exercise  "

## 2023-03-08 ENCOUNTER — CLINICAL SUPPORT (OUTPATIENT)
Dept: OBSTETRICS AND GYNECOLOGY | Facility: CLINIC | Age: 53
End: 2023-03-08
Payer: MEDICAID

## 2023-03-08 VITALS — SYSTOLIC BLOOD PRESSURE: 142 MMHG | WEIGHT: 154 LBS | BODY MASS INDEX: 28.17 KG/M2 | DIASTOLIC BLOOD PRESSURE: 92 MMHG

## 2023-03-08 DIAGNOSIS — Z76.89 ENCOUNTER FOR WEIGHT MANAGEMENT: ICD-10-CM

## 2023-03-08 DIAGNOSIS — E66.3 OVERWEIGHT (BMI 25.0-29.9): Primary | ICD-10-CM

## 2023-03-08 PROCEDURE — 99212 PR OFFICE/OUTPT VISIT, EST, LEVL II, 10-19 MIN: ICD-10-PCS | Mod: S$PBB,,, | Performed by: OBSTETRICS & GYNECOLOGY

## 2023-03-08 PROCEDURE — 99212 OFFICE O/P EST SF 10 MIN: CPT | Mod: PBBFAC

## 2023-03-08 PROCEDURE — 99999 PR PBB SHADOW E&M-EST. PATIENT-LVL II: CPT | Mod: PBBFAC,,,

## 2023-03-08 PROCEDURE — 99212 OFFICE O/P EST SF 10 MIN: CPT | Mod: S$PBB,,, | Performed by: OBSTETRICS & GYNECOLOGY

## 2023-03-08 PROCEDURE — 99999 PR PBB SHADOW E&M-EST. PATIENT-LVL II: ICD-10-PCS | Mod: PBBFAC,,,

## 2023-03-08 RX ORDER — PHENTERMINE HYDROCHLORIDE 37.5 MG/1
37.5 TABLET ORAL
Qty: 30 TABLET | Refills: 0 | Status: SHIPPED | OUTPATIENT
Start: 2023-03-08 | End: 2023-04-18 | Stop reason: SDUPTHER

## 2023-03-13 NOTE — PROGRESS NOTES
Awa desires refill of Adipex.   Her current prescription was begun on 4 months ago and she has lost 2 lb in the last month.  She desires to continue medication.    Review of systems:  No chest pain, shortness of breath, headache, constipation.  Body mass index is 28.17 kg/m².  BP (!) 142/92   Wt 69.9 kg (154 lb)   BMI 28.17 kg/m²    Exam:  Abdomen soft, obese, nontender.  Extremities no cyanosis, clubbing, edema  Assessment:  Obesity, weight management  Plan:  Refill Adipex.  Return to clinic in 1 month.  Diet and exercise

## 2023-04-18 ENCOUNTER — CLINICAL SUPPORT (OUTPATIENT)
Dept: OBSTETRICS AND GYNECOLOGY | Facility: CLINIC | Age: 53
End: 2023-04-18
Payer: MEDICAID

## 2023-04-18 VITALS
DIASTOLIC BLOOD PRESSURE: 96 MMHG | WEIGHT: 158 LBS | HEART RATE: 89 BPM | BODY MASS INDEX: 29.08 KG/M2 | SYSTOLIC BLOOD PRESSURE: 138 MMHG | HEIGHT: 62 IN

## 2023-04-18 DIAGNOSIS — E66.3 OVERWEIGHT (BMI 25.0-29.9): Primary | ICD-10-CM

## 2023-04-18 DIAGNOSIS — Z76.89 ENCOUNTER FOR WEIGHT MANAGEMENT: ICD-10-CM

## 2023-04-18 PROCEDURE — 99999 PR PBB SHADOW E&M-EST. PATIENT-LVL II: CPT | Mod: PBBFAC,,,

## 2023-04-18 PROCEDURE — 99212 PR OFFICE/OUTPT VISIT, EST, LEVL II, 10-19 MIN: ICD-10-PCS | Mod: S$PBB,,, | Performed by: OBSTETRICS & GYNECOLOGY

## 2023-04-18 PROCEDURE — 99212 OFFICE O/P EST SF 10 MIN: CPT | Mod: PBBFAC

## 2023-04-18 PROCEDURE — 99999 PR PBB SHADOW E&M-EST. PATIENT-LVL II: ICD-10-PCS | Mod: PBBFAC,,,

## 2023-04-18 PROCEDURE — 99212 OFFICE O/P EST SF 10 MIN: CPT | Mod: S$PBB,,, | Performed by: OBSTETRICS & GYNECOLOGY

## 2023-04-18 RX ORDER — PHENTERMINE HYDROCHLORIDE 37.5 MG/1
37.5 TABLET ORAL
Qty: 30 TABLET | Refills: 0 | Status: SHIPPED | OUTPATIENT
Start: 2023-04-18 | End: 2023-05-18

## 2023-04-18 NOTE — PROGRESS NOTES
Wm 6 pt up 4 lbs. Pt states she increased weights. Pt aware she must take a 4 month break following this appointment.

## 2023-04-18 NOTE — PROGRESS NOTES
"Awa desires refill of Adipex.   Her current prescription was begun on 5 months ago and she gained 4 lb in the last month.  She desires to continue medication.    Review of systems:  No chest pain, shortness of breath, headache, constipation.  Body mass index is 28.9 kg/m².  BP (!) 138/96   Pulse 89   Ht 5' 2" (1.575 m)   Wt 71.7 kg (158 lb)   BMI 28.90 kg/m²    Exam:  Abdomen soft, nontender.  Extremities no cyanosis, clubbing, edema  Assessment:  Overweight, weight management   Plan:  Refill Adipex.  Return to clinic in 4 months.  Diet and exercise  "

## 2023-08-16 ENCOUNTER — CLINICAL SUPPORT (OUTPATIENT)
Dept: OBSTETRICS AND GYNECOLOGY | Facility: CLINIC | Age: 53
End: 2023-08-16
Payer: MEDICAID

## 2023-08-16 VITALS
WEIGHT: 154 LBS | HEART RATE: 77 BPM | BODY MASS INDEX: 28.34 KG/M2 | HEIGHT: 62 IN | DIASTOLIC BLOOD PRESSURE: 85 MMHG | SYSTOLIC BLOOD PRESSURE: 148 MMHG

## 2023-08-16 DIAGNOSIS — Z76.89 ENCOUNTER FOR WEIGHT MANAGEMENT: ICD-10-CM

## 2023-08-16 DIAGNOSIS — E66.3 OVERWEIGHT (BMI 25.0-29.9): Primary | ICD-10-CM

## 2023-08-16 PROCEDURE — 99212 PR OFFICE/OUTPT VISIT, EST, LEVL II, 10-19 MIN: ICD-10-PCS | Mod: S$PBB,,, | Performed by: OBSTETRICS & GYNECOLOGY

## 2023-08-16 PROCEDURE — 99212 OFFICE O/P EST SF 10 MIN: CPT | Mod: S$PBB,,, | Performed by: OBSTETRICS & GYNECOLOGY

## 2023-08-16 PROCEDURE — 99999 PR PBB SHADOW E&M-EST. PATIENT-LVL II: ICD-10-PCS | Mod: PBBFAC,,,

## 2023-08-16 PROCEDURE — 99999 PR PBB SHADOW E&M-EST. PATIENT-LVL II: CPT | Mod: PBBFAC,,,

## 2023-08-16 PROCEDURE — 99212 OFFICE O/P EST SF 10 MIN: CPT | Mod: PBBFAC

## 2023-08-16 RX ORDER — PHENTERMINE HYDROCHLORIDE 37.5 MG/1
37.5 TABLET ORAL
Qty: 30 TABLET | Refills: 0 | Status: SHIPPED | OUTPATIENT
Start: 2023-08-16 | End: 2023-09-18 | Stop reason: SDUPTHER

## 2023-08-16 NOTE — PROGRESS NOTES
"Awa desires to begin weight loss medication.  Patient's current goal weight is 130 lb.  She is trying to make healthier choices with her diet and as far as exercising she is walking more and working out daily.  Review of systems:  No chest pain, shortness of breath, headache, constipation.  Body mass index is 28.17 kg/m².  BP (!) 148/85   Pulse 77   Ht 5' 2" (1.575 m)   Wt 69.9 kg (154 lb)   BMI 28.17 kg/m²    Exam:  Abdomen soft, nontender.  Extremities no cyanosis, clubbing, edema  Assessment:  Overweight, weight management  Plan:  Prescription for Adipex.  Return to clinic in 1 month.  Diet and exercise  "

## 2023-08-16 NOTE — PROGRESS NOTES
Wm1 pt goal weight is 130#. States she has been walking more and working out daily since last weight manangment. Diet changes include healthier options.

## 2023-09-18 ENCOUNTER — CLINICAL SUPPORT (OUTPATIENT)
Dept: OBSTETRICS AND GYNECOLOGY | Facility: CLINIC | Age: 53
End: 2023-09-18
Payer: MEDICAID

## 2023-09-18 VITALS
DIASTOLIC BLOOD PRESSURE: 80 MMHG | SYSTOLIC BLOOD PRESSURE: 138 MMHG | BODY MASS INDEX: 27.98 KG/M2 | WEIGHT: 153 LBS | HEART RATE: 72 BPM

## 2023-09-18 DIAGNOSIS — Z76.89 ENCOUNTER FOR WEIGHT MANAGEMENT: Primary | ICD-10-CM

## 2023-09-18 DIAGNOSIS — E66.3 OVERWEIGHT (BMI 25.0-29.9): ICD-10-CM

## 2023-09-18 PROCEDURE — 99212 OFFICE O/P EST SF 10 MIN: CPT | Mod: PBBFAC

## 2023-09-18 PROCEDURE — 99999 PR PBB SHADOW E&M-EST. PATIENT-LVL II: ICD-10-PCS | Mod: PBBFAC,,,

## 2023-09-18 PROCEDURE — 99212 OFFICE O/P EST SF 10 MIN: CPT | Mod: S$PBB,,, | Performed by: OBSTETRICS & GYNECOLOGY

## 2023-09-18 PROCEDURE — 99212 PR OFFICE/OUTPT VISIT, EST, LEVL II, 10-19 MIN: ICD-10-PCS | Mod: S$PBB,,, | Performed by: OBSTETRICS & GYNECOLOGY

## 2023-09-18 PROCEDURE — 99999 PR PBB SHADOW E&M-EST. PATIENT-LVL II: CPT | Mod: PBBFAC,,,

## 2023-09-18 RX ORDER — PHENTERMINE HYDROCHLORIDE 37.5 MG/1
37.5 TABLET ORAL
Qty: 30 TABLET | Refills: 0 | Status: SHIPPED | OUTPATIENT
Start: 2023-09-18 | End: 2023-10-16 | Stop reason: SDUPTHER

## 2023-09-18 NOTE — PROGRESS NOTES
Awa desires refill of adipex.   Her current prescription was begun on and she has lost 1 lb.  She desires to continue medication.  Review of systems: No chest pain, shortness of breath, headache constipation.  Body mass index is 27.98 kg/m².  /80   Pulse 72   Wt 69.4 kg (153 lb)   BMI 27.98 kg/m²    Exam:  Abdomen soft, nontender.  Extremities no cyanosis, clubbing, edema  Assessment:  Obesity, weight management   Plan:  Refill Adipex.  Return to clinic in 1 month.  Diet and exercise

## 2023-10-16 ENCOUNTER — CLINICAL SUPPORT (OUTPATIENT)
Dept: OBSTETRICS AND GYNECOLOGY | Facility: CLINIC | Age: 53
End: 2023-10-16
Payer: MEDICAID

## 2023-10-16 VITALS
DIASTOLIC BLOOD PRESSURE: 69 MMHG | SYSTOLIC BLOOD PRESSURE: 142 MMHG | BODY MASS INDEX: 28.27 KG/M2 | HEIGHT: 62 IN | WEIGHT: 153.63 LBS | HEART RATE: 76 BPM

## 2023-10-16 DIAGNOSIS — E66.3 OVERWEIGHT (BMI 25.0-29.9): ICD-10-CM

## 2023-10-16 DIAGNOSIS — Z76.89 ENCOUNTER FOR WEIGHT MANAGEMENT: Primary | ICD-10-CM

## 2023-10-16 PROCEDURE — 99999 PR PBB SHADOW E&M-EST. PATIENT-LVL II: ICD-10-PCS | Mod: PBBFAC,,,

## 2023-10-16 PROCEDURE — 99212 PR OFFICE/OUTPT VISIT, EST, LEVL II, 10-19 MIN: ICD-10-PCS | Mod: S$PBB,,, | Performed by: OBSTETRICS & GYNECOLOGY

## 2023-10-16 PROCEDURE — 99999 PR PBB SHADOW E&M-EST. PATIENT-LVL II: CPT | Mod: PBBFAC,,,

## 2023-10-16 PROCEDURE — 99212 OFFICE O/P EST SF 10 MIN: CPT | Mod: S$PBB,,, | Performed by: OBSTETRICS & GYNECOLOGY

## 2023-10-16 PROCEDURE — 99212 OFFICE O/P EST SF 10 MIN: CPT | Mod: PBBFAC

## 2023-10-16 RX ORDER — PHENTERMINE HYDROCHLORIDE 37.5 MG/1
37.5 TABLET ORAL
Qty: 30 TABLET | Refills: 0 | Status: SHIPPED | OUTPATIENT
Start: 2023-10-16 | End: 2023-10-18 | Stop reason: SDUPTHER

## 2023-10-16 NOTE — PROGRESS NOTES
"Awa desires refill of Adipex.   Her current prescription was begun on 2 months ago and her weight is the same as last month.  She desires to continue medication.  She is dieting and exercising.  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  Review of systems: No chest pain, shortness of breath, headache, constipation.  Body mass index is 28.09 kg/m².  BP (!) 142/69   Pulse 76   Ht 5' 2" (1.575 m)   Wt 69.7 kg (153 lb 9.6 oz)   BMI 28.09 kg/m²    Exam:  Abdomen soft, nontender.  Extremities no cyanosis, clubbing, edema  Assessment:  Obesity, weight management   Plan:  Refill Adipex.  Return to clinic in 1 month.  Diet and exercise  "

## 2023-10-17 ENCOUNTER — TELEPHONE (OUTPATIENT)
Dept: OBSTETRICS AND GYNECOLOGY | Facility: CLINIC | Age: 53
End: 2023-10-17
Payer: MEDICAID

## 2023-10-17 DIAGNOSIS — E66.3 OVERWEIGHT (BMI 25.0-29.9): ICD-10-CM

## 2023-10-17 NOTE — TELEPHONE ENCOUNTER
Pt states [facility] is not accepting medicaid-[facility]    Asking for adipex to be sent to [facility], [place]

## 2023-10-18 RX ORDER — PHENTERMINE HYDROCHLORIDE 37.5 MG/1
37.5 TABLET ORAL
Qty: 30 TABLET | Refills: 0 | Status: SHIPPED | OUTPATIENT
Start: 2023-10-18 | End: 2023-11-13 | Stop reason: SDUPTHER

## 2023-11-13 ENCOUNTER — CLINICAL SUPPORT (OUTPATIENT)
Dept: OBSTETRICS AND GYNECOLOGY | Facility: CLINIC | Age: 53
End: 2023-11-13
Payer: MEDICAID

## 2023-11-13 VITALS
HEIGHT: 62 IN | SYSTOLIC BLOOD PRESSURE: 144 MMHG | HEART RATE: 74 BPM | BODY MASS INDEX: 28.34 KG/M2 | WEIGHT: 154 LBS | DIASTOLIC BLOOD PRESSURE: 77 MMHG

## 2023-11-13 DIAGNOSIS — Z76.89 ENCOUNTER FOR WEIGHT MANAGEMENT: Primary | ICD-10-CM

## 2023-11-13 DIAGNOSIS — E66.3 OVERWEIGHT (BMI 25.0-29.9): ICD-10-CM

## 2023-11-13 PROCEDURE — 99212 OFFICE O/P EST SF 10 MIN: CPT | Mod: S$PBB,,, | Performed by: OBSTETRICS & GYNECOLOGY

## 2023-11-13 PROCEDURE — 99212 PR OFFICE/OUTPT VISIT, EST, LEVL II, 10-19 MIN: ICD-10-PCS | Mod: S$PBB,,, | Performed by: OBSTETRICS & GYNECOLOGY

## 2023-11-13 PROCEDURE — 99212 OFFICE O/P EST SF 10 MIN: CPT | Mod: PBBFAC

## 2023-11-13 PROCEDURE — 99999 PR PBB SHADOW E&M-EST. PATIENT-LVL II: ICD-10-PCS | Mod: PBBFAC,,,

## 2023-11-13 PROCEDURE — 99999 PR PBB SHADOW E&M-EST. PATIENT-LVL II: CPT | Mod: PBBFAC,,,

## 2023-11-14 RX ORDER — PHENTERMINE HYDROCHLORIDE 37.5 MG/1
37.5 TABLET ORAL
Qty: 30 TABLET | Refills: 0 | Status: SHIPPED | OUTPATIENT
Start: 2023-11-14 | End: 2023-12-11 | Stop reason: SDUPTHER

## 2023-11-14 NOTE — PROGRESS NOTES
"Awa desires refill of Adipex.   Her current prescription was begun on 3 months ago and she has gained 1 lb over the past month.  She desires to continue medication.    Review of systems:  No chest pain, shortness of breath, headache constipation.  Body mass index is 28.17 kg/m².  BP (!) 144/77   Pulse 74   Ht 5' 2" (1.575 m)   Wt 69.9 kg (154 lb)   BMI 28.17 kg/m²    Exam:  Abdomen soft, nontender.  Extremities no cyanosis, clubbing, edema  Assessment: Overweight, weight management   Plan:  Refill Adipex.  Return to clinic in 1 month.  Diet and exercise  "

## 2023-12-11 ENCOUNTER — CLINICAL SUPPORT (OUTPATIENT)
Dept: OBSTETRICS AND GYNECOLOGY | Facility: CLINIC | Age: 53
End: 2023-12-11
Payer: MEDICAID

## 2023-12-11 VITALS
WEIGHT: 155 LBS | BODY MASS INDEX: 28.35 KG/M2 | DIASTOLIC BLOOD PRESSURE: 76 MMHG | HEART RATE: 66 BPM | SYSTOLIC BLOOD PRESSURE: 138 MMHG

## 2023-12-11 DIAGNOSIS — Z76.89 ENCOUNTER FOR WEIGHT MANAGEMENT: Primary | ICD-10-CM

## 2023-12-11 DIAGNOSIS — E66.3 OVERWEIGHT (BMI 25.0-29.9): ICD-10-CM

## 2023-12-11 PROCEDURE — 99212 OFFICE O/P EST SF 10 MIN: CPT | Mod: S$PBB,,, | Performed by: OBSTETRICS & GYNECOLOGY

## 2023-12-11 PROCEDURE — 99212 PR OFFICE/OUTPT VISIT, EST, LEVL II, 10-19 MIN: ICD-10-PCS | Mod: S$PBB,,, | Performed by: OBSTETRICS & GYNECOLOGY

## 2023-12-11 PROCEDURE — 99999 PR PBB SHADOW E&M-EST. PATIENT-LVL I: CPT | Mod: PBBFAC,,,

## 2023-12-11 PROCEDURE — 99211 OFF/OP EST MAY X REQ PHY/QHP: CPT | Mod: PBBFAC

## 2023-12-11 PROCEDURE — 99999 PR PBB SHADOW E&M-EST. PATIENT-LVL I: ICD-10-PCS | Mod: PBBFAC,,,

## 2023-12-11 RX ORDER — PHENTERMINE HYDROCHLORIDE 37.5 MG/1
37.5 TABLET ORAL
Qty: 30 TABLET | Refills: 0 | Status: SHIPPED | OUTPATIENT
Start: 2023-12-11 | End: 2024-01-08 | Stop reason: SDUPTHER

## 2023-12-11 NOTE — PROGRESS NOTES
Awa desires refill of Adipex.   Her current prescription was begun 4 months ago and she has gained 1 lb in the last month.  She desires to continue medication.  Review of systems:  no chest pain, shortness of breath, headache, constipation.  Body mass index is 28.35 kg/m².  /76   Pulse 66   Wt 70.3 kg (155 lb)   BMI 28.35 kg/m²    Exam:  Abdomen soft, nontender.  Extremities no cyanosis, clubbing, edema  Assessment:  Overweight, weight management  Plan:  Refill Adipex.  Return to clinic in 1 month.  Diet and exercise

## 2024-01-08 ENCOUNTER — CLINICAL SUPPORT (OUTPATIENT)
Dept: OBSTETRICS AND GYNECOLOGY | Facility: CLINIC | Age: 54
End: 2024-01-08
Payer: MEDICAID

## 2024-01-08 VITALS
HEIGHT: 62 IN | SYSTOLIC BLOOD PRESSURE: 138 MMHG | BODY MASS INDEX: 28.34 KG/M2 | WEIGHT: 154 LBS | DIASTOLIC BLOOD PRESSURE: 76 MMHG

## 2024-01-08 DIAGNOSIS — Z76.89 ENCOUNTER FOR WEIGHT MANAGEMENT: Primary | ICD-10-CM

## 2024-01-08 DIAGNOSIS — E66.3 OVERWEIGHT (BMI 25.0-29.9): ICD-10-CM

## 2024-01-08 PROCEDURE — 99211 OFF/OP EST MAY X REQ PHY/QHP: CPT | Mod: PBBFAC

## 2024-01-08 PROCEDURE — 99212 OFFICE O/P EST SF 10 MIN: CPT | Mod: S$PBB,,, | Performed by: OBSTETRICS & GYNECOLOGY

## 2024-01-08 PROCEDURE — 99999 PR PBB SHADOW E&M-EST. PATIENT-LVL I: CPT | Mod: PBBFAC,,,

## 2024-01-08 RX ORDER — PHENTERMINE HYDROCHLORIDE 37.5 MG/1
37.5 TABLET ORAL
Qty: 30 TABLET | Refills: 0 | Status: SHIPPED | OUTPATIENT
Start: 2024-01-08 | End: 2024-02-07

## 2024-01-08 NOTE — PROGRESS NOTES
"Awa desires refill of adipex.   Her current prescription was begun on 5 months ago and she is lost 1 lb in the last month.  She desires to continue medication.    Review of systems: No chest pain, shortness of breath, headache, constipation.  Body mass index is 28.17 kg/m².  /76   Ht 5' 2" (1.575 m)   Wt 69.9 kg (154 lb)   BMI 28.17 kg/m²    Exam:  Abdomen soft, nontender.  Extremities no cyanosis, clubbing, edema  Assessment: Overweight, weight management  Plan:  Refill Adipex.  Return to clinic in 1 month.  Diet and exercise  "

## 2024-01-24 ENCOUNTER — HOSPITAL ENCOUNTER (EMERGENCY)
Facility: HOSPITAL | Age: 54
Discharge: HOME | End: 2024-01-24
Attending: STUDENT IN AN ORGANIZED HEALTH CARE EDUCATION/TRAINING PROGRAM
Payer: MEDICAID

## 2024-01-24 VITALS
SYSTOLIC BLOOD PRESSURE: 118 MMHG | WEIGHT: 154 LBS | OXYGEN SATURATION: 100 % | BODY MASS INDEX: 28.17 KG/M2 | RESPIRATION RATE: 18 BRPM | TEMPERATURE: 98 F | HEART RATE: 84 BPM | DIASTOLIC BLOOD PRESSURE: 78 MMHG

## 2024-01-24 DIAGNOSIS — R41.82 AMS (ALTERED MENTAL STATUS): ICD-10-CM

## 2024-01-24 DIAGNOSIS — R07.9 CHEST PAIN, UNSPECIFIED TYPE: Primary | ICD-10-CM

## 2024-01-24 LAB
ALBUMIN SERPL BCP-MCNC: 3.4 G/DL (ref 3.5–5.2)
ALP SERPL-CCNC: 72 U/L (ref 55–135)
ALT SERPL W/O P-5'-P-CCNC: 25 U/L (ref 10–44)
AMPHET+METHAMPHET UR QL: NEGATIVE
ANION GAP SERPL CALC-SCNC: 8 MMOL/L (ref 8–16)
AST SERPL-CCNC: 17 U/L (ref 10–40)
B-HCG UR QL: NEGATIVE
BARBITURATES UR QL SCN>200 NG/ML: NEGATIVE
BASOPHILS # BLD AUTO: 0.04 K/UL (ref 0–0.2)
BASOPHILS NFR BLD: 0.5 % (ref 0–1.9)
BENZODIAZ UR QL SCN>200 NG/ML: NEGATIVE
BILIRUB SERPL-MCNC: 0.4 MG/DL (ref 0.1–1)
BILIRUB UR QL STRIP: NEGATIVE
BUN SERPL-MCNC: 18 MG/DL (ref 6–20)
BZE UR QL SCN: NEGATIVE
CALCIUM SERPL-MCNC: 8.8 MG/DL (ref 8.7–10.5)
CANNABINOIDS UR QL SCN: NEGATIVE
CHLORIDE SERPL-SCNC: 111 MMOL/L (ref 95–110)
CLARITY UR: CLEAR
CO2 SERPL-SCNC: 22 MMOL/L (ref 23–29)
COLOR UR: YELLOW
CREAT SERPL-MCNC: 0.8 MG/DL (ref 0.5–1.4)
CREAT UR-MCNC: 158.1 MG/DL (ref 15–325)
D DIMER PPP IA.FEU-MCNC: 0.23 MG/L FEU
DIFFERENTIAL METHOD BLD: ABNORMAL
EOSINOPHIL # BLD AUTO: 0.1 K/UL (ref 0–0.5)
EOSINOPHIL NFR BLD: 0.7 % (ref 0–8)
ERYTHROCYTE [DISTWIDTH] IN BLOOD BY AUTOMATED COUNT: 13.2 % (ref 11.5–14.5)
EST. GFR  (NO RACE VARIABLE): >60 ML/MIN/1.73 M^2
GLUCOSE SERPL-MCNC: 120 MG/DL (ref 70–110)
GLUCOSE UR QL STRIP: NEGATIVE
HCT VFR BLD AUTO: 38.7 % (ref 37–48.5)
HGB BLD-MCNC: 12.6 G/DL (ref 12–16)
HGB UR QL STRIP: ABNORMAL
IMM GRANULOCYTES # BLD AUTO: 0.02 K/UL (ref 0–0.04)
IMM GRANULOCYTES NFR BLD AUTO: 0.2 % (ref 0–0.5)
INFLUENZA A, MOLECULAR: NEGATIVE
INFLUENZA B, MOLECULAR: NEGATIVE
KETONES UR QL STRIP: NEGATIVE
LEUKOCYTE ESTERASE UR QL STRIP: NEGATIVE
LYMPHOCYTES # BLD AUTO: 1.8 K/UL (ref 1–4.8)
LYMPHOCYTES NFR BLD: 21.5 % (ref 18–48)
MCH RBC QN AUTO: 31.2 PG (ref 27–31)
MCHC RBC AUTO-ENTMCNC: 32.6 G/DL (ref 32–36)
MCV RBC AUTO: 96 FL (ref 82–98)
METHADONE UR QL SCN>300 NG/ML: NEGATIVE
MONOCYTES # BLD AUTO: 0.5 K/UL (ref 0.3–1)
MONOCYTES NFR BLD: 6.3 % (ref 4–15)
NEUTROPHILS # BLD AUTO: 5.8 K/UL (ref 1.8–7.7)
NEUTROPHILS NFR BLD: 70.8 % (ref 38–73)
NITRITE UR QL STRIP: NEGATIVE
NRBC BLD-RTO: 0 /100 WBC
OPIATES UR QL SCN: NEGATIVE
PCP UR QL SCN>25 NG/ML: NEGATIVE
PH UR STRIP: 8 [PH] (ref 5–8)
PLATELET # BLD AUTO: 270 K/UL (ref 150–450)
PMV BLD AUTO: 9.3 FL (ref 9.2–12.9)
POCT GLUCOSE: 116 MG/DL (ref 70–110)
POTASSIUM SERPL-SCNC: 3.9 MMOL/L (ref 3.5–5.1)
PROT SERPL-MCNC: 6.3 G/DL (ref 6–8.4)
PROT UR QL STRIP: NEGATIVE
RBC # BLD AUTO: 4.04 M/UL (ref 4–5.4)
SARS-COV-2 RDRP RESP QL NAA+PROBE: NEGATIVE
SODIUM SERPL-SCNC: 141 MMOL/L (ref 136–145)
SP GR UR STRIP: 1.02 (ref 1–1.03)
SPECIMEN SOURCE: NORMAL
TOXICOLOGY INFORMATION: NORMAL
TROPONIN I SERPL DL<=0.01 NG/ML-MCNC: 0.04 NG/ML (ref 0–0.03)
TSH SERPL DL<=0.005 MIU/L-ACNC: 1.46 UIU/ML (ref 0.4–4)
URN SPEC COLLECT METH UR: ABNORMAL
UROBILINOGEN UR STRIP-ACNC: NEGATIVE EU/DL
WBC # BLD AUTO: 8.19 K/UL (ref 3.9–12.7)

## 2024-01-24 PROCEDURE — 85379 FIBRIN DEGRADATION QUANT: CPT | Performed by: STUDENT IN AN ORGANIZED HEALTH CARE EDUCATION/TRAINING PROGRAM

## 2024-01-24 PROCEDURE — 25000003 PHARM REV CODE 250: Performed by: STUDENT IN AN ORGANIZED HEALTH CARE EDUCATION/TRAINING PROGRAM

## 2024-01-24 PROCEDURE — 81025 URINE PREGNANCY TEST: CPT | Performed by: STUDENT IN AN ORGANIZED HEALTH CARE EDUCATION/TRAINING PROGRAM

## 2024-01-24 PROCEDURE — 25500020 PHARM REV CODE 255: Performed by: STUDENT IN AN ORGANIZED HEALTH CARE EDUCATION/TRAINING PROGRAM

## 2024-01-24 PROCEDURE — U0002 COVID-19 LAB TEST NON-CDC: HCPCS | Performed by: STUDENT IN AN ORGANIZED HEALTH CARE EDUCATION/TRAINING PROGRAM

## 2024-01-24 PROCEDURE — 93010 ELECTROCARDIOGRAM REPORT: CPT | Mod: ,,, | Performed by: INTERNAL MEDICINE

## 2024-01-24 PROCEDURE — 96360 HYDRATION IV INFUSION INIT: CPT | Mod: 59

## 2024-01-24 PROCEDURE — 99285 EMERGENCY DEPT VISIT HI MDM: CPT | Mod: 25

## 2024-01-24 PROCEDURE — 84484 ASSAY OF TROPONIN QUANT: CPT | Performed by: STUDENT IN AN ORGANIZED HEALTH CARE EDUCATION/TRAINING PROGRAM

## 2024-01-24 PROCEDURE — 82962 GLUCOSE BLOOD TEST: CPT

## 2024-01-24 PROCEDURE — 80053 COMPREHEN METABOLIC PANEL: CPT | Performed by: STUDENT IN AN ORGANIZED HEALTH CARE EDUCATION/TRAINING PROGRAM

## 2024-01-24 PROCEDURE — 80307 DRUG TEST PRSMV CHEM ANLYZR: CPT | Performed by: STUDENT IN AN ORGANIZED HEALTH CARE EDUCATION/TRAINING PROGRAM

## 2024-01-24 PROCEDURE — 87502 INFLUENZA DNA AMP PROBE: CPT | Performed by: STUDENT IN AN ORGANIZED HEALTH CARE EDUCATION/TRAINING PROGRAM

## 2024-01-24 PROCEDURE — 93005 ELECTROCARDIOGRAM TRACING: CPT

## 2024-01-24 PROCEDURE — 85025 COMPLETE CBC W/AUTO DIFF WBC: CPT | Performed by: STUDENT IN AN ORGANIZED HEALTH CARE EDUCATION/TRAINING PROGRAM

## 2024-01-24 PROCEDURE — 84443 ASSAY THYROID STIM HORMONE: CPT | Performed by: STUDENT IN AN ORGANIZED HEALTH CARE EDUCATION/TRAINING PROGRAM

## 2024-01-24 PROCEDURE — 81003 URINALYSIS AUTO W/O SCOPE: CPT | Mod: 59 | Performed by: STUDENT IN AN ORGANIZED HEALTH CARE EDUCATION/TRAINING PROGRAM

## 2024-01-24 RX ADMIN — IOHEXOL 75 ML: 350 INJECTION, SOLUTION INTRAVENOUS at 02:01

## 2024-01-24 RX ADMIN — SODIUM CHLORIDE 1000 ML: 0.9 INJECTION, SOLUTION INTRAVENOUS at 10:01

## 2024-01-24 NOTE — ED PROVIDER NOTES
"Encounter Date: 1/24/2024       History     Chief Complaint   Patient presents with    Altered Mental Status     53-year-old female with history of hypertension, H pylori, presenting with chest pain.  EMS reports the patient's daughter called 911 because patient was having chest pain and reports a high heart rate in the 180s.  They report that on route, patient follow the commands, bed down, and heart rate returned to the 80s.  Patient initially less responsive, however after a few minutes in the ER, states that she was not speaking because she felt "too weak to speak", but denies any focal numbness or weakness.  During this period patient was following commands and responding to voice.      Review of patient's allergies indicates:  No Known Allergies  Past Medical History:   Diagnosis Date    Calculus of gallbladder     Chronic gastritis 08/28/2017    H. pylori infection     Hyperbilirubinemia 06/15/2017    Hypertension     Obesity     SOB (shortness of breath)      Past Surgical History:   Procedure Laterality Date    APPENDECTOMY      CHOLECYSTECTOMY N/A 08/29/2017    COLONOSCOPY N/A 12/15/2021    Procedure: COLONOSCOPY;  Surgeon: Leigh East MD;  Location: [facility];  Service: General;  Laterality: N/A;  3  0700    WISDOM TOOTH EXTRACTION       Family History   Problem Relation Age of Onset    No Known Problems Mother     Heart attack Father 62    Breast cancer Father     Lung cancer Maternal Grandmother     Breast cancer Paternal Aunt     Breast cancer Paternal Uncle     Breast cancer Paternal Grandmother     No Known Problems Sister     No Known Problems Daughter     No Known Problems Maternal Aunt     No Known Problems Maternal Uncle     No Known Problems Maternal Grandfather     No Known Problems Paternal Grandfather     Ovarian cancer Neg Hx     BRCA 1/2 Neg Hx      Social History     Tobacco Use    Smoking status: Never    Smokeless tobacco: Never   Substance Use Topics    Alcohol use: Yes     " Comment: socially     Drug use: No     Review of Systems   Constitutional:  Positive for fatigue. Negative for fever.   HENT:  Negative for congestion and sore throat.    Respiratory:  Negative for cough and shortness of breath.    Cardiovascular:  Positive for chest pain.   Gastrointestinal:  Negative for abdominal pain, diarrhea, nausea and vomiting.   Genitourinary:  Negative for dysuria.   Musculoskeletal:  Negative for back pain.   Skin:  Negative for rash.   Neurological:  Negative for weakness.   Hematological:  Does not bruise/bleed easily.       Physical Exam     Initial Vitals   BP Pulse Resp Temp SpO2   01/24/24 1031 01/24/24 1029 01/24/24 1029 01/24/24 1029 01/24/24 1029   102/62 95 18 98 °F (36.7 °C) 98 %      MAP       --                Physical Exam    Nursing note and vitals reviewed.  Constitutional: She appears well-developed.   HENT:   Head: Normocephalic.   Eyes: Pupils are equal, round, and reactive to light.   Neck:   Normal range of motion.  Cardiovascular:            No murmur heard.  Pulmonary/Chest: No respiratory distress.   Clear lungs bilaterally.  No respiratory distress.  No wheezing or rales.  Good air movement.     Abdominal: Abdomen is soft. She exhibits no distension. There is no abdominal tenderness. There is no rebound.   Musculoskeletal:         General: No edema.      Cervical back: Normal range of motion.     Neurological: She is alert.   Alert and oriented to person place and time. No facial droop. CN 2-12 intact. Fluent speech with expression and comprehension. Strength 5/5 and sensation intact in upper and lower extremities.    Skin: Skin is warm.   Psychiatric: She has a normal mood and affect.         ED Course   Procedures  Labs Reviewed   CBC W/ AUTO DIFFERENTIAL - Abnormal; Notable for the following components:       Result Value    MCH 31.2 (*)     All other components within normal limits   COMPREHENSIVE METABOLIC PANEL - Abnormal; Notable for the following  components:    Chloride 111 (*)     CO2 22 (*)     Glucose 120 (*)     Albumin 3.4 (*)     All other components within normal limits   URINALYSIS, REFLEX TO URINE CULTURE - Abnormal; Notable for the following components:    Occult Blood UA Trace (*)     All other components within normal limits    Narrative:     Specimen Source->Urine   TROPONIN I - Abnormal; Notable for the following components:    Troponin I 0.040 (*)     All other components within normal limits   POCT GLUCOSE - Abnormal; Notable for the following components:    POCT Glucose 116 (*)     All other components within normal limits   INFLUENZA A & B BY MOLECULAR   DRUG SCREEN PANEL, URINE EMERGENCY    Narrative:     Specimen Source->Urine   TSH   D DIMER, QUANTITATIVE   SARS-COV-2 RNA AMPLIFICATION, QUAL   PREGNANCY TEST, URINE RAPID    Narrative:     Specimen Source->Urine   B-TYPE NATRIURETIC PEPTIDE   POCT GLUCOSE MONITORING CONTINUOUS     EKG Readings: (Independently Interpreted)   Initial Reading: No STEMI. Rhythm: Normal Sinus Rhythm. Heart Rate: 98. Ectopy: No Ectopy. Conduction: Normal.       Imaging Results              CT Chest With Contrast (Final result)  Result time 01/24/24 14:31:55      Final result by Tracee Fernández MD (01/24/24 14:31:55)                   Impression:      Imaging findings suggestive for mild interstitial pulmonary edema.  No focal airspace consolidation is seen.      Electronically signed by: Tracee Fernández MD  Date:    01/24/2024  Time:    14:31               Narrative:    EXAMINATION:  CT CHEST WITH CONTRAST    CLINICAL HISTORY:  Respiratory illness, nondiagnostic xray;    TECHNIQUE:  Low dose axial images, sagittal and coronal reformations were obtained from the thoracic inlet to the lung bases following the IV administration of 01/24/2024 mL of Omnipaque 350.    COMPARISON:  None    FINDINGS:  The thyroid appears normal.  The main central airways are patent.  The esophagus appears normal.  The heart is normal  in size.  Trace pericardial fluid.  Small mediastinal lymph nodes, not enlarged by CT criteria.  No hilar or axillary adenopathy is seen.  The aorta is of normal caliber and tapers appropriately without evidence for aneurysm or dissection.    Calcified granuloma in the right lung.  There is interlobular septal thickening seen bilaterally, most pronounced in the lung bases, more so on the left suggesting interstitial edema with some minimal ground-glass opacity noted in the lungs bilaterally.    Limited evaluation of the upper abdominal structures show prior cholecystectomy.    Age-appropriate degenerative changes affect the skeleton.                                       X-Ray Chest AP Portable (Final result)  Result time 01/24/24 11:18:54      Final result by Tracee Fernández MD (01/24/24 11:18:54)                   Impression:      As above.      Electronically signed by: Tracee Fernández MD  Date:    01/24/2024  Time:    11:18               Narrative:    EXAMINATION:  XR CHEST AP PORTABLE    CLINICAL HISTORY:  ams;    TECHNIQUE:  Single frontal view of the chest was performed.    COMPARISON:  None    FINDINGS:  Questionable patchy opacity in the retrocardiac region on the left which could reflect atelectasis, aspiration or pneumonia.  Right lung appears grossly clear.  Heart is normal in size.  Skeletal structures are intact.                                       Medications   sodium chloride 0.9% bolus 1,000 mL 1,000 mL (0 mLs Intravenous Stopped 1/24/24 1158)   iohexoL (OMNIPAQUE 350) injection 75 mL (75 mLs Intravenous Given 1/24/24 1410)     Medical Decision Making  DDX: Generalized weakness with chest pain - r/o infection, significant anemia, ACS, arrhythmia, electrolyte abnormality, TABATHA, metabolic abnormality. Unlikely ICH/mass given nonfocal neuro exam, no concerning history, but will consider if change in exam.  DX: BMP, CBC, troponin. EKG. CXR, UA. NCHCT if change in neuro exam/symptoms.  TX: Analgesia  PRN. Treatment/consult as indicated by studies.   DISPO: Pending studies.          Amount and/or Complexity of Data Reviewed  Labs: ordered. Decision-making details documented in ED Course.  Radiology: ordered.    Risk  Prescription drug management.               ED Course as of 01/24/24 1457   Wed Jan 24, 2024   1148 Troponin I(!): 0.040  Down trending from baseline three years ago. [NB]   1328 Patient reports significant improvement in symptoms. [NB]   1436 Marquis Devlin 2:36 PM  Symptoms resolved.  Discussed results, findings, supportive care, follow up recommendations, and return to ED precautions with this patient. Patient verbalized understanding and agreement.  Patient is stable for discharge at this time.   [NB]      ED Course User Index  [NB] Marquis Devlin MD                           Clinical Impression:  Final diagnoses:  [R41.82] AMS (altered mental status)  [R07.9] Chest pain, unspecified type (Primary)          ED Disposition Condition    Discharge Stable          ED Prescriptions    None       Follow-up Information       Follow up With Specialties Details Why Contact [facility] - Emergency Dept Emergency Medicine  If symptoms worsen [address] [facility] [id number] [phone]    Donnie Honeycutt MD Cardiology, Interventional Cardiology Schedule an appointment as soon as possible for a visit in 2 days  [address] [facility] [address]  [phone]      [facility] Psychology, Internal Medicine, Gynecology, Dental General Practice Schedule an appointment as soon as possible for a visit in 2 days  [address] [facility] [address]  [phone]               Marquis Devlin MD  01/24/24 1049       Marquis Devlin MD  01/24/24 1102       Marquis Devlin MD  01/24/24 1103       Marquis Devlin MD  01/24/24 5487

## 2024-01-24 NOTE — ED TRIAGE NOTES
53 y.o. female presents to ER ED 05/ED 05 No chief complaint on file.  . Here per [facility], EMS reports pt's daughter called 911 because pt was having chest pain and high HR, hx of SVT, initial HR 180s, pt beared down and HR went to 80s, pt not answering questions in triage

## 2024-01-26 ENCOUNTER — HOSPITAL ENCOUNTER (OUTPATIENT)
Dept: RADIOLOGY | Facility: HOSPITAL | Age: 54
Discharge: HOME | End: 2024-01-26
Attending: OBSTETRICS & GYNECOLOGY
Payer: MEDICAID

## 2024-01-26 VITALS — BODY MASS INDEX: 28.34 KG/M2 | HEIGHT: 62 IN | WEIGHT: 154 LBS

## 2024-01-26 DIAGNOSIS — Z12.31 ENCOUNTER FOR SCREENING MAMMOGRAM FOR BREAST CANCER: ICD-10-CM

## 2024-01-26 PROCEDURE — 77067 SCR MAMMO BI INCL CAD: CPT | Mod: TC

## 2024-02-01 ENCOUNTER — OFFICE VISIT (OUTPATIENT)
Dept: CARDIOLOGY | Facility: CLINIC | Age: 54
End: 2024-02-01
Payer: MEDICAID

## 2024-02-01 VITALS
BODY MASS INDEX: 28.44 KG/M2 | HEART RATE: 78 BPM | WEIGHT: 154.56 LBS | RESPIRATION RATE: 16 BRPM | HEIGHT: 62 IN | DIASTOLIC BLOOD PRESSURE: 92 MMHG | SYSTOLIC BLOOD PRESSURE: 140 MMHG

## 2024-02-01 DIAGNOSIS — R55 SYNCOPE AND COLLAPSE: ICD-10-CM

## 2024-02-01 DIAGNOSIS — I47.10 SVT (SUPRAVENTRICULAR TACHYCARDIA): ICD-10-CM

## 2024-02-01 DIAGNOSIS — R07.9 CHEST PAIN, UNSPECIFIED TYPE: ICD-10-CM

## 2024-02-01 DIAGNOSIS — R00.2 PALPITATIONS: ICD-10-CM

## 2024-02-01 DIAGNOSIS — R79.89 ELEVATED TROPONIN: Primary | ICD-10-CM

## 2024-02-01 DIAGNOSIS — R42 DIZZINESS: ICD-10-CM

## 2024-02-01 PROCEDURE — 1159F MED LIST DOCD IN RCRD: CPT | Mod: CPTII,,, | Performed by: NURSE PRACTITIONER

## 2024-02-01 PROCEDURE — 3080F DIAST BP >= 90 MM HG: CPT | Mod: CPTII,,, | Performed by: NURSE PRACTITIONER

## 2024-02-01 PROCEDURE — 99204 OFFICE O/P NEW MOD 45 MIN: CPT | Mod: S$PBB,,, | Performed by: NURSE PRACTITIONER

## 2024-02-01 PROCEDURE — 99999 PR PBB SHADOW E&M-EST. PATIENT-LVL III: CPT | Mod: PBBFAC,,, | Performed by: NURSE PRACTITIONER

## 2024-02-01 PROCEDURE — 3008F BODY MASS INDEX DOCD: CPT | Mod: CPTII,,, | Performed by: NURSE PRACTITIONER

## 2024-02-01 PROCEDURE — 99213 OFFICE O/P EST LOW 20 MIN: CPT | Mod: PBBFAC | Performed by: NURSE PRACTITIONER

## 2024-02-01 PROCEDURE — 3077F SYST BP >= 140 MM HG: CPT | Mod: CPTII,,, | Performed by: NURSE PRACTITIONER

## 2024-02-01 RX ORDER — LOSARTAN POTASSIUM 25 MG/1
25 TABLET ORAL DAILY
Qty: 90 TABLET | Refills: 3 | Status: SHIPPED | OUTPATIENT
Start: 2024-02-01 | End: 2025-01-31

## 2024-02-01 RX ORDER — ESCITALOPRAM OXALATE 20 MG/1
20 TABLET ORAL DAILY
COMMUNITY
End: 2024-02-29 | Stop reason: SDUPTHER

## 2024-02-01 RX ORDER — HYDROXYZINE PAMOATE 25 MG/1
25 CAPSULE ORAL 2 TIMES DAILY PRN
Qty: 30 CAPSULE | Refills: 1 | Status: SHIPPED | OUTPATIENT
Start: 2024-02-01

## 2024-02-01 RX ORDER — METOPROLOL SUCCINATE 25 MG/1
25 TABLET, EXTENDED RELEASE ORAL DAILY
Qty: 30 TABLET | Refills: 0 | Status: SHIPPED | OUTPATIENT
Start: 2024-02-01 | End: 2024-02-02

## 2024-02-01 NOTE — PROGRESS NOTES
[facility]    CC: Chest pain   Chief Complaint   Patient presents with    Chest Pain     C/o heaviness to chest       Patient ID: Awa Vargas is a 53 y.o. female with a past medical history of HTN, NSVT, chest pain    HPI  ED visit 1/24/24 for pre-syncopal episode while driving  Prior to episode, patient reported chest pain and elevated HR in the 180s  Per chart review, EMS stated HR returned to 80s after bearing down  Does have a history of SVT and takes Toprol 25 mg daily  Her ED workup was essentially negative with the exception of mildly elevated troponin at 0.040    Patient accompanied with daughter today and is here for follow-up   She denies any reoccurring symptoms     She reports a history of hypertension but no longer take antihypertensive  She states she was on losartan in the past.  She does monitor her blood pressures at home and reports systolic blood pressure is 140s to 160s over 90s    Elevated troponin; in the setting of possible SVT      Past Medical History:   Diagnosis Date    Calculus of gallbladder     Chronic gastritis 08/28/2017    H. pylori infection     Hyperbilirubinemia 06/15/2017    Hypertension     Obesity     SOB (shortness of breath)      Past Surgical History:   Procedure Laterality Date    APPENDECTOMY      CHOLECYSTECTOMY N/A 08/29/2017    COLONOSCOPY N/A 12/15/2021    Procedure: COLONOSCOPY;  Surgeon: Leigh East MD;  Location: [facility];  Service: General;  Laterality: N/A;  3  0700    WISDOM TOOTH EXTRACTION       Social History     Socioeconomic History    Marital status: Single   Tobacco Use    Smoking status: Never    Smokeless tobacco: Never   Substance and Sexual Activity    Alcohol use: Yes     Comment: socially     Drug use: No    Sexual activity: Not Currently     Partners: Male     Comment:      Family History   Problem Relation Age of Onset    No Known Problems Mother     Heart attack Father 62    Breast cancer Father     Lung  "cancer Maternal Grandmother     Breast cancer Paternal Aunt     Breast cancer Paternal Uncle     Breast cancer Paternal Grandmother     No Known Problems Sister     No Known Problems Daughter     No Known Problems Maternal Aunt     No Known Problems Maternal Uncle     No Known Problems Maternal Grandfather     No Known Problems Paternal Grandfather     Ovarian cancer Neg Hx     BRCA 1/2 Neg Hx        Review of patient's allergies indicates:  No Known Allergies    Medication List with Changes/Refills   Current Medications    ESCITALOPRAM OXALATE (LEXAPRO) 20 MG TABLET    Take 20 mg by mouth once daily.    FLUTICASONE PROPIONATE (FLONASE) 50 MCG/ACTUATION NASAL SPRAY    2 sprays by Each Nostril route once daily.    MELOXICAM (MOBIC) 7.5 MG TABLET        METOPROLOL SUCCINATE (TOPROL-XL) 25 MG 24 HR TABLET    Take 1 tablet (25 mg total) by mouth once daily.    PHENTERMINE (ADIPEX-P) 37.5 MG TABLET    Take 1 tablet (37.5 mg total) by mouth before breakfast.   Discontinued Medications    ESCITALOPRAM OXALATE (LEXAPRO) 10 MG TABLET    TOME MARTHA DOBSON TOS LOS D AS           Review of Systems   Constitutional: Negative.   Cardiovascular:  Positive for chest pain, near-syncope and palpitations. Negative for claudication, cyanosis, dyspnea on exertion, irregular heartbeat, leg swelling, orthopnea, paroxysmal nocturnal dyspnea and syncope.   Respiratory:  Positive for shortness of breath.    Skin: Negative.    Musculoskeletal: Negative.        Vitals:    02/01/24 1122   BP: (!) 140/92   Pulse: 78   Resp: 16   Weight: 70.1 kg (154 lb 8.7 oz)   Height: 5' 2" (1.575 m)          Physical Exam  Constitutional:       Appearance: She is obese.   Cardiovascular:      Rate and Rhythm: Normal rate and regular rhythm.      Pulses: Normal pulses.      Heart sounds: Normal heart sounds.   Pulmonary:      Effort: Pulmonary effort is normal.      Breath sounds: Normal breath sounds.   Musculoskeletal:         General: Normal range of " "motion.   Skin:     General: Skin is warm.      Capillary Refill: Capillary refill takes less than 2 seconds.   Neurological:      Mental Status: She is alert.   Psychiatric:         Mood and Affect: Mood normal.           Labs:  Most Recent Data  CBC:   Lab Results   Component Value Date    WBC 8.19 01/24/2024    HGB 12.6 01/24/2024    HCT 38.7 01/24/2024     01/24/2024    MCV 96 01/24/2024    RDW 13.2 01/24/2024     BMP:   Lab Results   Component Value Date     01/24/2024    K 3.9 01/24/2024     (H) 01/24/2024    CO2 22 (L) 01/24/2024    BUN 18 01/24/2024    CREATININE 0.8 01/24/2024     (H) 01/24/2024    CALCIUM 8.8 01/24/2024    MG 2.1 09/01/2020     LFTS;   Lab Results   Component Value Date    PROT 6.3 01/24/2024    ALBUMIN 3.4 (L) 01/24/2024    BILITOT 0.4 01/24/2024    AST 17 01/24/2024    ALKPHOS 72 01/24/2024    ALT 25 01/24/2024     COAGS: No results found for: "INR", "PROTIME", "PTT"  FLP: No results found for: "CHOL", "HDL", "LDLCALC", "TRIG", "CHOLHDL"  CARDIAC:   Lab Results   Component Value Date    TROPONINI 0.040 (H) 01/24/2024           Assessment/Plan:  Problem List Items Addressed This Visit          Cardiac/Vascular    SVT (supraventricular tachycardia)    Chest pain    Elevated troponin - Primary     Increase Toprol to 50 mg   Start losartan 25 mg b.i.d.   Check echocardiogram to assess LV function and valvular structure following concerns for NSVT, and hypertension   Suspicious for atypical chest pain in the setting of SVT  Check Holter   Elevated troponin likely demand ischemia however we discussed ischemic eval once blood pressures are better    Follow-up in approximately 4 weeks    Total duration of face to face visit time 30 minutes.  Total time spent counseling greater than fifty percent of total visit time.  Counseling included discussion regarding imaging findings, diagnosis, possibilities, treatment options, risks and benefits.  The patient had many questions " regarding the options and long-term effects.    Astrid Mireles, CECILEP-C  Cardiology Clinic  [facility]

## 2024-02-02 RX ORDER — METOPROLOL SUCCINATE 50 MG/1
50 TABLET, EXTENDED RELEASE ORAL DAILY
Qty: 30 TABLET | Refills: 0 | Status: SHIPPED | OUTPATIENT
Start: 2024-02-02 | End: 2024-03-03

## 2024-02-08 ENCOUNTER — HOSPITAL ENCOUNTER (OUTPATIENT)
Dept: PULMONOLOGY | Facility: HOSPITAL | Age: 54
Discharge: HOME | End: 2024-02-08
Attending: NURSE PRACTITIONER
Payer: MEDICAID

## 2024-02-08 DIAGNOSIS — R00.2 PALPITATIONS: ICD-10-CM

## 2024-02-08 DIAGNOSIS — I47.10 SVT (SUPRAVENTRICULAR TACHYCARDIA): ICD-10-CM

## 2024-02-08 DIAGNOSIS — R42 DIZZINESS: ICD-10-CM

## 2024-02-08 DIAGNOSIS — R07.9 CHEST PAIN, UNSPECIFIED TYPE: ICD-10-CM

## 2024-02-08 PROCEDURE — 93306 TTE W/DOPPLER COMPLETE: CPT

## 2024-02-08 PROCEDURE — 93306 TTE W/DOPPLER COMPLETE: CPT | Mod: 26,,, | Performed by: INTERNAL MEDICINE

## 2024-02-09 LAB
ASCENDING AORTA: 2.61 CM
AV INDEX (PROSTH): 0.84
AV MEAN GRADIENT: 2 MMHG
AV PEAK GRADIENT: 3 MMHG
AV VALVE AREA BY VELOCITY RATIO: 2.46 CM²
AV VALVE AREA: 2.65 CM²
AV VELOCITY RATIO: 0.78
CV ECHO LV RWT: 0.39 CM
DOP CALC AO PEAK VEL: 0.92 M/S
DOP CALC AO VTI: 19.2 CM
DOP CALC LVOT AREA: 3.1 CM2
DOP CALC LVOT DIAMETER: 2 CM
DOP CALC LVOT PEAK VEL: 0.72 M/S
DOP CALC LVOT STROKE VOLUME: 50.87 CM3
DOP CALC RVOT PEAK VEL: 0.53 M/S
DOP CALC RVOT VTI: 12.8 CM
DOP CALCLVOT PEAK VEL VTI: 16.2 CM
E WAVE DECELERATION TIME: 187 MSEC
E/A RATIO: 0.91
E/E' RATIO: 6.56 M/S
ECHO LV POSTERIOR WALL: 0.94 CM (ref 0.6–1.1)
FRACTIONAL SHORTENING: 39 % (ref 28–44)
INTERVENTRICULAR SEPTUM: 0.98 CM (ref 0.6–1.1)
IVRT: 121.79 MSEC
LA MAJOR: 5.07 CM
LA MINOR: 4.54 CM
LA WIDTH: 3.5 CM
LEFT ATRIUM SIZE: 3.55 CM
LEFT ATRIUM VOLUME MOD: 56.64 CM3
LEFT ATRIUM VOLUME: 50.59 CM3
LEFT INTERNAL DIMENSION IN SYSTOLE: 2.92 CM (ref 2.1–4)
LEFT VENTRICLE DIASTOLIC VOLUME: 106.79 ML
LEFT VENTRICLE SYSTOLIC VOLUME: 32.63 ML
LEFT VENTRICULAR INTERNAL DIMENSION IN DIASTOLE: 4.79 CM (ref 3.5–6)
LEFT VENTRICULAR MASS: 160.52 G
LV LATERAL E/E' RATIO: 8.43 M/S
LV SEPTAL E/E' RATIO: 5.36 M/S
LVOT MG: 1.15 MMHG
LVOT MV: 0.51 CM/S
MV PEAK A VEL: 0.65 M/S
MV PEAK E VEL: 0.59 M/S
MV STENOSIS PRESSURE HALF TIME: 54.23 MS
MV VALVE AREA P 1/2 METHOD: 4.06 CM2
PISA TR MAX VEL: 2.18 M/S
PULM VEIN S/D RATIO: 1.39
PV MEAN GRADIENT: 1 MMHG
PV MV: 0.6 M/S
PV PEAK D VEL: 0.36 M/S
PV PEAK GRADIENT: 3 MMHG
PV PEAK S VEL: 0.5 M/S
PV PEAK VELOCITY: 0.86 M/S
RA MAJOR: 4.29 CM
RA PRESSURE ESTIMATED: 3 MMHG
RIGHT VENTRICULAR END-DIASTOLIC DIMENSION: 2.97 CM
RV TB RVSP: 5 MMHG
SINUS: 2.83 CM
STJ: 2.52 CM
TDI LATERAL: 0.07 M/S
TDI SEPTAL: 0.11 M/S
TDI: 0.09 M/S
TR MAX PG: 19 MMHG
TV REST PULMONARY ARTERY PRESSURE: 22 MMHG

## 2024-02-19 ENCOUNTER — HOSPITAL ENCOUNTER (OUTPATIENT)
Dept: PULMONOLOGY | Facility: HOSPITAL | Age: 54
Discharge: HOME | End: 2024-02-19
Attending: NURSE PRACTITIONER
Payer: MEDICAID

## 2024-02-19 PROCEDURE — 93225 XTRNL ECG REC<48 HRS REC: CPT

## 2024-02-19 PROCEDURE — 93227 XTRNL ECG REC<48 HR R&I: CPT | Mod: ,,, | Performed by: INTERNAL MEDICINE

## 2024-02-21 ENCOUNTER — TELEPHONE (OUTPATIENT)
Dept: CARDIOLOGY | Facility: CLINIC | Age: 54
End: 2024-02-21
Payer: MEDICAID

## 2024-02-21 NOTE — PROGRESS NOTES
Your echo reveals an overall normally functioning heart. There are some valvular changes and size changes, but this should stabilize since starting treatment for hypertension and fast rhythm.

## 2024-02-21 NOTE — TELEPHONE ENCOUNTER
----- Message from Astrid Mireles NP sent at 2/21/2024 10:52 AM CST -----  Your echo reveals an overall normally functioning heart. There are some valvular changes and size changes, but this should stabilize since starting treatment for hypertension and fast rhythm.

## 2024-02-29 ENCOUNTER — OFFICE VISIT (OUTPATIENT)
Dept: CARDIOLOGY | Facility: CLINIC | Age: 54
End: 2024-02-29
Payer: MEDICAID

## 2024-02-29 VITALS
DIASTOLIC BLOOD PRESSURE: 88 MMHG | BODY MASS INDEX: 29.13 KG/M2 | SYSTOLIC BLOOD PRESSURE: 138 MMHG | RESPIRATION RATE: 18 BRPM | HEIGHT: 62 IN | OXYGEN SATURATION: 99 % | HEART RATE: 59 BPM | WEIGHT: 158.31 LBS

## 2024-02-29 DIAGNOSIS — R07.89 OTHER CHEST PAIN: ICD-10-CM

## 2024-02-29 DIAGNOSIS — R79.89 ELEVATED TROPONIN: ICD-10-CM

## 2024-02-29 DIAGNOSIS — F41.9 ANXIETY: Primary | ICD-10-CM

## 2024-02-29 DIAGNOSIS — I47.10 SVT (SUPRAVENTRICULAR TACHYCARDIA): ICD-10-CM

## 2024-02-29 PROCEDURE — 1159F MED LIST DOCD IN RCRD: CPT | Mod: CPTII,,, | Performed by: NURSE PRACTITIONER

## 2024-02-29 PROCEDURE — 3079F DIAST BP 80-89 MM HG: CPT | Mod: CPTII,,, | Performed by: NURSE PRACTITIONER

## 2024-02-29 PROCEDURE — 99213 OFFICE O/P EST LOW 20 MIN: CPT | Mod: PBBFAC | Performed by: NURSE PRACTITIONER

## 2024-02-29 PROCEDURE — 99214 OFFICE O/P EST MOD 30 MIN: CPT | Mod: S$PBB,,, | Performed by: NURSE PRACTITIONER

## 2024-02-29 PROCEDURE — 3008F BODY MASS INDEX DOCD: CPT | Mod: CPTII,,, | Performed by: NURSE PRACTITIONER

## 2024-02-29 PROCEDURE — 99999 PR PBB SHADOW E&M-EST. PATIENT-LVL III: CPT | Mod: PBBFAC,,, | Performed by: NURSE PRACTITIONER

## 2024-02-29 PROCEDURE — 4010F ACE/ARB THERAPY RXD/TAKEN: CPT | Mod: CPTII,,, | Performed by: NURSE PRACTITIONER

## 2024-02-29 PROCEDURE — 3075F SYST BP GE 130 - 139MM HG: CPT | Mod: CPTII,,, | Performed by: NURSE PRACTITIONER

## 2024-02-29 RX ORDER — ESCITALOPRAM OXALATE 20 MG/1
20 TABLET ORAL DAILY
Qty: 90 TABLET | Refills: 3 | Status: SHIPPED | OUTPATIENT
Start: 2024-02-29

## 2024-02-29 RX ORDER — METOPROLOL SUCCINATE 25 MG/1
25 TABLET, EXTENDED RELEASE ORAL DAILY PRN
Qty: 30 TABLET | Refills: 11 | Status: SHIPPED | OUTPATIENT
Start: 2024-02-29 | End: 2024-04-09 | Stop reason: SDUPTHER

## 2024-02-29 NOTE — PROGRESS NOTES
Cardiology Clinic note    Subjective:   Patient ID:  Awa Vargas is a 53 y.o. female who presents for follow-up of SVT, chest pain, hypertension    HPI:   Awa Vargas  has a past medical history of Calculus of gallbladder, Chronic gastritis (08/28/2017), H. pylori infection, Hyperbilirubinemia (06/15/2017), Hypertension, Obesity, and SOB (shortness of breath).    ED visit 1/24/24 for pre-syncopal episode while driving  Prior to episode, patient reported chest pain and elevated HR in the 180s  Per chart review, EMS stated HR returned to 80s after bearing down  Does have a history of SVT and takes Toprol 25 mg daily, recently increased to 50 mg  Her ED workup was essentially negative with the exception of mildly elevated troponin at 0.040     Patient accompanied with daughter today and is here for follow-up  Had normal Holter monitor and echocardiogram revealing normal EF February 2024  Toprol recently increased to 50 mg, however she tells me she abruptly stopped her Lexapro and has increased anxiety and depression   She she notes a 1 time occurrence of palpitations since last visit lasting a few minutes since and resolving     Hypertension; losartan 25 mg and Toprol 50 mg  Tells me blood pressures is 110s to 120s at home  Blood pressure 138/88 today     Elevated troponin; in the setting of possible SVT         Patient Active Problem List    Diagnosis Date Noted    Diverticulosis 12/15/2021    Chest pain 08/31/2020    Elevated troponin 08/31/2020    Anxiety 08/31/2020    SVT (supraventricular tachycardia) 08/30/2020    Calculus of gallbladder without cholecystitis without obstruction 08/29/2017    Obesity, Class I, BMI 30-34.9 08/28/2017    Chronic gastritis 08/28/2017    GERD (gastroesophageal reflux disease)     Calculus of gallbladder 08/08/2017     Scheduled for lap juan on 07/28/2017      Epigastric pain 06/15/2017       Patient's Medications   New Prescriptions    METOPROLOL SUCCINATE  "(TOPROL-XL) 25 MG 24 HR TABLET    Take 1 tablet (25 mg total) by mouth daily as needed (As needed for palpitations).   Previous Medications    FLUTICASONE PROPIONATE (FLONASE) 50 MCG/ACTUATION NASAL SPRAY    2 sprays by Each Nostril route once daily.    HYDROXYZINE PAMOATE (VISTARIL) 25 MG CAP    Take 1 capsule (25 mg total) by mouth 2 (two) times daily as needed (for anxiety).    LOSARTAN (COZAAR) 25 MG TABLET    Take 1 tablet (25 mg total) by mouth once daily.    MELOXICAM (MOBIC) 7.5 MG TABLET        METOPROLOL SUCCINATE (TOPROL-XL) 50 MG 24 HR TABLET    Take 1 tablet (50 mg total) by mouth once daily.   Modified Medications    Modified Medication Previous Medication    ESCITALOPRAM OXALATE (LEXAPRO) 20 MG TABLET EScitalopram oxalate (LEXAPRO) 20 MG tablet       Take 1 tablet (20 mg total) by mouth once daily.    Take 20 mg by mouth once daily.   Discontinued Medications    No medications on file        Review of Systems   Constitutional: Negative.   Cardiovascular:  Positive for chest pain, near-syncope and palpitations. Negative for claudication, cyanosis, dyspnea on exertion, irregular heartbeat, leg swelling, orthopnea, paroxysmal nocturnal dyspnea and syncope.   Respiratory:  Positive for shortness of breath.    Skin: Negative.    Musculoskeletal: Negative.          Objective:   Vitals  Vitals:    02/29/24 1122   BP: 138/88   Pulse: (!) 59   Resp: 18   SpO2: 99%   Weight: 71.8 kg (158 lb 4.6 oz)   Height: 5' 2" (1.575 m)          Physical Exam  Constitutional:       Appearance: She is obese.   Cardiovascular:      Rate and Rhythm: Normal rate and regular rhythm.      Pulses: Normal pulses.      Heart sounds: Normal heart sounds.   Pulmonary:      Effort: Pulmonary effort is normal.      Breath sounds: Normal breath sounds.   Musculoskeletal:         General: Normal range of motion.   Skin:     General: Skin is warm.      Capillary Refill: Capillary refill takes less than 2 seconds.   Neurological:      " "Mental Status: She is alert.   Psychiatric:         Mood and Affect: Mood normal.           Lab Results    Lab Results   Component Value Date    WBC 8.19 01/24/2024    HGB 12.6 01/24/2024    HCT 38.7 01/24/2024    MCV 96 01/24/2024       Lab Results   Component Value Date     01/24/2024       Lab Results   Component Value Date    K 3.9 01/24/2024    MG 2.1 09/01/2020    BUN 18 01/24/2024    CREATININE 0.8 01/24/2024       Lab Results   Component Value Date     (H) 01/24/2024       Lab Results   Component Value Date    AST 17 01/24/2024    ALT 25 01/24/2024    ALBUMIN 3.4 (L) 01/24/2024    PROT 6.3 01/24/2024       No results found for: "CHOL", "HDL", "LDLCALC", "TRIG"    Assessment:     Problem List Items Addressed This Visit          Psychiatric    Anxiety - Primary       Cardiac/Vascular    SVT (supraventricular tachycardia)    Chest pain    Elevated troponin       Plan:     Normal Holter  EF normal  She stopped SSRI approximately 1 month ago abruptly  This could be contributing to her increased palpitations; patient voiced understanding  I will give her an additional 25 mg to be taken as needed for infrequent episodes of palpitations    I suspect symptoms are likely related to untreated anxiety given the above  Resume SSRI  I will continue to defer stress testing at this time    Continue with current medical plan and lifestyle changes.    Follow up in 3-4 month  Return sooner for concerns or questions. If symptoms persist go to the ED    She expressed verbal understanding and agreed with the plan    Thank you for the opportunity to care for this patient. Will be available for questions if needed.     Total duration of face to face visit time 30 minutes.  Total time spent counseling greater than fifty percent of total visit time.  Counseling included discussion regarding imaging findings, diagnosis, possibilities, treatment options, risks and benefits.    Astrid Mireles, JAMEL-TYLER  Cardiology " Clinic [facility]

## 2024-03-01 LAB
OHS CV EVENT MONITOR DAY: 0
OHS CV HOLTER LENGTH DECIMAL HOURS: 48
OHS CV HOLTER LENGTH HOURS: 48
OHS CV HOLTER LENGTH MINUTES: 0
OHS CV HOLTER SINUS AVERAGE HR: 78
OHS CV HOLTER SINUS MAX HR: 141
OHS CV HOLTER SINUS MIN HR: 54

## 2024-03-12 ENCOUNTER — TELEPHONE (OUTPATIENT)
Dept: CARDIOLOGY | Facility: CLINIC | Age: 54
End: 2024-03-12
Payer: MEDICAID

## 2024-04-10 RX ORDER — METOPROLOL SUCCINATE 25 MG/1
25 TABLET, EXTENDED RELEASE ORAL DAILY PRN
Qty: 30 TABLET | Refills: 11 | Status: ON HOLD | OUTPATIENT
Start: 2024-04-10 | End: 2024-05-14 | Stop reason: HOSPADM

## 2024-05-08 ENCOUNTER — CLINICAL SUPPORT (OUTPATIENT)
Dept: OBSTETRICS AND GYNECOLOGY | Facility: CLINIC | Age: 54
End: 2024-05-08
Payer: MEDICAID

## 2024-05-08 VITALS
DIASTOLIC BLOOD PRESSURE: 58 MMHG | SYSTOLIC BLOOD PRESSURE: 123 MMHG | HEART RATE: 63 BPM | WEIGHT: 165 LBS | BODY MASS INDEX: 30.36 KG/M2 | HEIGHT: 62 IN

## 2024-05-08 DIAGNOSIS — E66.9 OBESITY (BMI 30-39.9): Primary | ICD-10-CM

## 2024-05-08 DIAGNOSIS — Z76.89 ENCOUNTER FOR WEIGHT MANAGEMENT: ICD-10-CM

## 2024-05-08 PROCEDURE — 99212 OFFICE O/P EST SF 10 MIN: CPT | Mod: S$PBB,,, | Performed by: OBSTETRICS & GYNECOLOGY

## 2024-05-08 PROCEDURE — 99999 PR PBB SHADOW E&M-EST. PATIENT-LVL III: CPT | Mod: PBBFAC,,,

## 2024-05-08 PROCEDURE — 99213 OFFICE O/P EST LOW 20 MIN: CPT | Mod: PBBFAC

## 2024-05-13 ENCOUNTER — HOSPITAL ENCOUNTER (INPATIENT)
Facility: HOSPITAL | Age: 54
LOS: 1 days | Discharge: HOME | DRG: 310 | End: 2024-05-14
Attending: EMERGENCY MEDICINE | Admitting: HOSPITALIST
Payer: MEDICAID

## 2024-05-13 DIAGNOSIS — R07.9 CHEST PAIN: Primary | ICD-10-CM

## 2024-05-13 DIAGNOSIS — R79.89 ELEVATED TROPONIN: ICD-10-CM

## 2024-05-13 PROBLEM — R74.01 TRANSAMINITIS: Status: ACTIVE | Noted: 2024-05-13

## 2024-05-13 PROBLEM — F10.10 ALCOHOL ABUSE: Status: ACTIVE | Noted: 2024-05-13

## 2024-05-13 LAB
ALBUMIN SERPL BCP-MCNC: 3.7 G/DL (ref 3.5–5.2)
ALP SERPL-CCNC: 76 U/L (ref 55–135)
ALT SERPL W/O P-5'-P-CCNC: 99 U/L (ref 10–44)
ANION GAP SERPL CALC-SCNC: 11 MMOL/L (ref 8–16)
ASCENDING AORTA: 2.73 CM
AST SERPL-CCNC: 126 U/L (ref 10–40)
AV INDEX (PROSTH): 0.63
AV MEAN GRADIENT: 4 MMHG
AV PEAK GRADIENT: 6 MMHG
AV VALVE AREA BY VELOCITY RATIO: 2.04 CM²
AV VALVE AREA: 1.95 CM²
AV VELOCITY RATIO: 0.66
BASOPHILS # BLD AUTO: 0.04 K/UL (ref 0–0.2)
BASOPHILS NFR BLD: 0.6 % (ref 0–1.9)
BILIRUB SERPL-MCNC: 0.4 MG/DL (ref 0.1–1)
BNP SERPL-MCNC: 103 PG/ML (ref 0–99)
BSA FOR ECHO PROCEDURE: 1.77 M2
BUN SERPL-MCNC: 24 MG/DL (ref 6–20)
CALCIUM SERPL-MCNC: 9.1 MG/DL (ref 8.7–10.5)
CHLORIDE SERPL-SCNC: 107 MMOL/L (ref 95–110)
CO2 SERPL-SCNC: 23 MMOL/L (ref 23–29)
CREAT SERPL-MCNC: 0.8 MG/DL (ref 0.5–1.4)
CV ECHO LV RWT: 0.37 CM
CV STRESS BASE HR: 69 BPM
DIASTOLIC BLOOD PRESSURE: 85 MMHG
DIFFERENTIAL METHOD BLD: ABNORMAL
DOP CALC AO PEAK VEL: 1.19 M/S
DOP CALC AO VTI: 25.8 CM
DOP CALC LVOT AREA: 3.1 CM2
DOP CALC LVOT DIAMETER: 1.99 CM
DOP CALC LVOT PEAK VEL: 0.78 M/S
DOP CALC LVOT STROKE VOLUME: 50.36 CM3
DOP CALC MV VTI: 19.2 CM
DOP CALCLVOT PEAK VEL VTI: 16.2 CM
E WAVE DECELERATION TIME: 139.23 MSEC
E/A RATIO: 1.82
E/E' RATIO: 8 M/S
ECHO LV POSTERIOR WALL: 0.87 CM (ref 0.6–1.1)
EOSINOPHIL # BLD AUTO: 0.1 K/UL (ref 0–0.5)
EOSINOPHIL NFR BLD: 1.7 % (ref 0–8)
ERYTHROCYTE [DISTWIDTH] IN BLOOD BY AUTOMATED COUNT: 13.2 % (ref 11.5–14.5)
EST. GFR  (NO RACE VARIABLE): >60 ML/MIN/1.73 M^2
FRACTIONAL SHORTENING: 36 % (ref 28–44)
GLUCOSE SERPL-MCNC: 130 MG/DL (ref 70–110)
HCT VFR BLD AUTO: 42 % (ref 37–48.5)
HGB BLD-MCNC: 13.4 G/DL (ref 12–16)
IMM GRANULOCYTES # BLD AUTO: 0.03 K/UL (ref 0–0.04)
IMM GRANULOCYTES NFR BLD AUTO: 0.4 % (ref 0–0.5)
INTERVENTRICULAR SEPTUM: 0.94 CM (ref 0.6–1.1)
IVC DIAMETER: 1.7 CM
IVRT: 88.49 MSEC
LA MAJOR: 5.1 CM
LA MINOR: 5.19 CM
LA WIDTH: 4.4 CM
LEFT ATRIUM SIZE: 3.24 CM
LEFT ATRIUM VOLUME INDEX: 35.6 ML/M2
LEFT ATRIUM VOLUME: 62.34 CM3
LEFT INTERNAL DIMENSION IN SYSTOLE: 3 CM (ref 2.1–4)
LEFT VENTRICLE DIASTOLIC VOLUME INDEX: 58.17 ML/M2
LEFT VENTRICLE DIASTOLIC VOLUME: 101.79 ML
LEFT VENTRICLE MASS INDEX: 82 G/M2
LEFT VENTRICLE SYSTOLIC VOLUME INDEX: 20 ML/M2
LEFT VENTRICLE SYSTOLIC VOLUME: 34.94 ML
LEFT VENTRICULAR INTERNAL DIMENSION IN DIASTOLE: 4.69 CM (ref 3.5–6)
LEFT VENTRICULAR MASS: 143.26 G
LV LATERAL E/E' RATIO: 8 M/S
LV SEPTAL E/E' RATIO: 8 M/S
LVOT MG: 1.3 MMHG
LVOT MV: 0.53 CM/S
LYMPHOCYTES # BLD AUTO: 1.5 K/UL (ref 1–4.8)
LYMPHOCYTES NFR BLD: 21.4 % (ref 18–48)
MCH RBC QN AUTO: 31.1 PG (ref 27–31)
MCHC RBC AUTO-ENTMCNC: 31.9 G/DL (ref 32–36)
MCV RBC AUTO: 97 FL (ref 82–98)
MONOCYTES # BLD AUTO: 0.4 K/UL (ref 0.3–1)
MONOCYTES NFR BLD: 5.5 % (ref 4–15)
MV MEAN GRADIENT: 1 MMHG
MV PEAK A VEL: 0.44 M/S
MV PEAK E VEL: 0.8 M/S
MV PEAK GRADIENT: 3 MMHG
MV STENOSIS PRESSURE HALF TIME: 40.22 MS
MV VALVE AREA BY CONTINUITY EQUATION: 2.62 CM2
MV VALVE AREA P 1/2 METHOD: 5.47 CM2
NEUTROPHILS # BLD AUTO: 5 K/UL (ref 1.8–7.7)
NEUTROPHILS NFR BLD: 70.4 % (ref 38–73)
NRBC BLD-RTO: 0 /100 WBC
NUC STRESS EJECTION FRACTION: 65 %
OHS CV CPX 85 PERCENT MAX PREDICTED HEART RATE MALE: 143
OHS CV CPX MAX PREDICTED HEART RATE: 168
OHS CV CPX PATIENT IS FEMALE: 1
OHS CV CPX PATIENT IS MALE: 0
OHS CV CPX PEAK DIASTOLIC BLOOD PRESSURE: 91 MMHG
OHS CV CPX PEAK HEAR RATE: 101 BPM
OHS CV CPX PEAK RATE PRESSURE PRODUCT: NORMAL
OHS CV CPX PEAK SYSTOLIC BLOOD PRESSURE: 114 MMHG
OHS CV CPX PERCENT MAX PREDICTED HEART RATE ACHIEVED: 63
OHS CV CPX RATE PRESSURE PRODUCT PRESENTING: 8763
OHS CV RV/LV RATIO: 0.68 CM
OHS QRS DURATION: 78 MS
OHS QTC CALCULATION: 469 MS
PISA TR MAX VEL: 2.17 M/S
PLATELET # BLD AUTO: 261 K/UL (ref 150–450)
PMV BLD AUTO: 9.1 FL (ref 9.2–12.9)
POTASSIUM SERPL-SCNC: 4.1 MMOL/L (ref 3.5–5.1)
PROT SERPL-MCNC: 7 G/DL (ref 6–8.4)
PULM VEIN S/D RATIO: 1.43
PV PEAK D VEL: 0.3 M/S
PV PEAK GRADIENT: 3 MMHG
PV PEAK S VEL: 0.43 M/S
PV PEAK VELOCITY: 0.83 M/S
RA MAJOR: 4.9 CM
RA PRESSURE ESTIMATED: 3 MMHG
RA WIDTH: 3.4 CM
RBC # BLD AUTO: 4.31 M/UL (ref 4–5.4)
RIGHT VENTRICULAR END-DIASTOLIC DIMENSION: 3.18 CM
RV TB RVSP: 5 MMHG
RV TISSUE DOPPLER FREE WALL SYSTOLIC VELOCITY 1 (APICAL 4 CHAMBER VIEW): 11.71 CM/S
SINUS: 3.21 CM
SODIUM SERPL-SCNC: 141 MMOL/L (ref 136–145)
STJ: 2.38 CM
SYSTOLIC BLOOD PRESSURE: 127 MMHG
TDI LATERAL: 0.1 M/S
TDI SEPTAL: 0.1 M/S
TDI: 0.1 M/S
TR MAX PG: 19 MMHG
TRICUSPID ANNULAR PLANE SYSTOLIC EXCURSION: 1.56 CM
TROPONIN I SERPL DL<=0.01 NG/ML-MCNC: 0.07 NG/ML (ref 0–0.03)
TROPONIN I SERPL DL<=0.01 NG/ML-MCNC: 0.07 NG/ML (ref 0–0.03)
TROPONIN I SERPL DL<=0.01 NG/ML-MCNC: 0.1 NG/ML (ref 0–0.03)
TROPONIN I SERPL DL<=0.01 NG/ML-MCNC: 0.11 NG/ML (ref 0–0.03)
TSH SERPL DL<=0.005 MIU/L-ACNC: 0.94 UIU/ML (ref 0.4–4)
TV REST PULMONARY ARTERY PRESSURE: 22 MMHG
WBC # BLD AUTO: 7.05 K/UL (ref 3.9–12.7)
Z-SCORE OF LEFT VENTRICULAR DIMENSION IN END DIASTOLE: -0.32
Z-SCORE OF LEFT VENTRICULAR DIMENSION IN END SYSTOLE: 0.01

## 2024-05-13 PROCEDURE — 84484 ASSAY OF TROPONIN QUANT: CPT | Mod: 91 | Performed by: HOSPITALIST

## 2024-05-13 PROCEDURE — 84443 ASSAY THYROID STIM HORMONE: CPT | Performed by: HOSPITALIST

## 2024-05-13 PROCEDURE — 93010 ELECTROCARDIOGRAM REPORT: CPT | Mod: ,,, | Performed by: INTERNAL MEDICINE

## 2024-05-13 PROCEDURE — 21400001 HC TELEMETRY ROOM

## 2024-05-13 PROCEDURE — 85025 COMPLETE CBC W/AUTO DIFF WBC: CPT | Performed by: EMERGENCY MEDICINE

## 2024-05-13 PROCEDURE — 25000003 PHARM REV CODE 250: Performed by: HOSPITALIST

## 2024-05-13 PROCEDURE — 36415 COLL VENOUS BLD VENIPUNCTURE: CPT | Performed by: HOSPITALIST

## 2024-05-13 PROCEDURE — 25000003 PHARM REV CODE 250: Performed by: EMERGENCY MEDICINE

## 2024-05-13 PROCEDURE — A9502 TC99M TETROFOSMIN: HCPCS | Performed by: HOSPITALIST

## 2024-05-13 PROCEDURE — 84484 ASSAY OF TROPONIN QUANT: CPT | Mod: 91

## 2024-05-13 PROCEDURE — 80053 COMPREHEN METABOLIC PANEL: CPT | Performed by: EMERGENCY MEDICINE

## 2024-05-13 PROCEDURE — 83880 ASSAY OF NATRIURETIC PEPTIDE: CPT | Performed by: EMERGENCY MEDICINE

## 2024-05-13 PROCEDURE — 99285 EMERGENCY DEPT VISIT HI MDM: CPT | Mod: 25

## 2024-05-13 PROCEDURE — 99223 1ST HOSP IP/OBS HIGH 75: CPT | Mod: 25,,, | Performed by: INTERNAL MEDICINE

## 2024-05-13 PROCEDURE — 84484 ASSAY OF TROPONIN QUANT: CPT | Performed by: EMERGENCY MEDICINE

## 2024-05-13 PROCEDURE — 93005 ELECTROCARDIOGRAM TRACING: CPT

## 2024-05-13 PROCEDURE — 63600175 PHARM REV CODE 636 W HCPCS: Performed by: INTERNAL MEDICINE

## 2024-05-13 RX ORDER — ASPIRIN 325 MG
325 TABLET ORAL
Status: COMPLETED | OUTPATIENT
Start: 2024-05-13 | End: 2024-05-13

## 2024-05-13 RX ORDER — METOPROLOL SUCCINATE 50 MG/1
50 TABLET, EXTENDED RELEASE ORAL DAILY
COMMUNITY
Start: 2024-02-02 | End: 2024-06-06

## 2024-05-13 RX ORDER — REGADENOSON 0.08 MG/ML
0.4 INJECTION, SOLUTION INTRAVENOUS ONCE
Status: COMPLETED | OUTPATIENT
Start: 2024-05-13 | End: 2024-05-13

## 2024-05-13 RX ORDER — ESCITALOPRAM OXALATE 10 MG/1
20 TABLET ORAL DAILY
Status: DISCONTINUED | OUTPATIENT
Start: 2024-05-13 | End: 2024-05-14 | Stop reason: HOSPADM

## 2024-05-13 RX ORDER — METOPROLOL SUCCINATE 50 MG/1
50 TABLET, EXTENDED RELEASE ORAL DAILY
Status: DISCONTINUED | OUTPATIENT
Start: 2024-05-13 | End: 2024-05-14 | Stop reason: HOSPADM

## 2024-05-13 RX ADMIN — TETROFOSMIN 31.2 MILLICURIE: 1.38 INJECTION, POWDER, LYOPHILIZED, FOR SOLUTION INTRAVENOUS at 12:05

## 2024-05-13 RX ADMIN — ESCITALOPRAM OXALATE 20 MG: 10 TABLET ORAL at 03:05

## 2024-05-13 RX ADMIN — ASPIRIN 325 MG ORAL TABLET 325 MG: 325 PILL ORAL at 06:05

## 2024-05-13 RX ADMIN — TETROFOSMIN 10.5 MILLICURIE: 1.38 INJECTION, POWDER, LYOPHILIZED, FOR SOLUTION INTRAVENOUS at 10:05

## 2024-05-13 RX ADMIN — METOPROLOL SUCCINATE 50 MG: 50 TABLET, EXTENDED RELEASE ORAL at 03:05

## 2024-05-13 RX ADMIN — REGADENOSON 0.4 MG: 0.08 INJECTION, SOLUTION INTRAVENOUS at 12:05

## 2024-05-13 NOTE — ADMISSIONCARE
AdmissionCare    Guideline: Myocardial Infarction, Inpatient    Based on the indications selected for the patient, the bed status of Inpatient was determined to be MET    The following indications were selected as present at the time of evaluation of the patient:      - Acute myocardial infarction (MI) (not in context of cardiac procedure within last 48 hours), as indicated by ALL of the following:    - Elevated cardiac troponin level, as indicated by 1 or more of the following:     - New or presumed new elevation of cardiac troponin level (ie, elevation clearly not due to chronic condition)     - Initial troponin elevated with subsequent increase or decrease in level of 20% or more (ie, indicative of acute myocardial injury)    - Myocardial injury due to acute ischemia, as indicated by 1 or more of the following:     - Symptoms consistent with myocardial ischemia (eg, chest pain, dyspnea)    AdmissionCare documentation entered by: Aysha Ybarra    [facility], 27th edition, Copyright © 2023 [facility] [place] [facility] [employer], [facility] All Rights Reserved.  [id number]

## 2024-05-13 NOTE — ED TRIAGE NOTES
"Awa Vargas, a 53 y.o. female presents to the ED via EMS w/ complaint of constant, aching, left sided chest pain that radiates to left shoulder and down left arm onset 11pm last night. Pain is reproducible upon palpitation. Pt reports prior to EMS arrival she was nauseous and clammy. EMS reports upon arrival, pt was noted to be in SVT with a rate in the 170's. EMS administered 6mg of adenosine and pt converted back to sinus rhythm in the 80's. Pt reports nausea resolved prior to arrival to ER but she feels extremely weak at this time. Pt denies any other complaints. VSS and NADN.    Triage note:  Chief Complaint   Patient presents with    Chest Pain     Patient presents to ED via  EMS Unit 14 secondary to constant chest pain beginning at 2300. Pain to left chest and radiates to left shoulder and described as "Pressure." Upon EMS arrival, patient was in SVT with a rate of 170's. Received 6mg of adenosine and converted to sinus rhythm in the 80's. Also had 300 cc of normal saline infused while en route.      Review of patient's allergies indicates:  No Known Allergies  Past Medical History:   Diagnosis Date    Calculus of gallbladder     Chronic gastritis 08/28/2017    H. pylori infection     Hyperbilirubinemia 06/15/2017    Hypertension     Obesity     SOB (shortness of breath)       "

## 2024-05-13 NOTE — HPI
Awa Vargas is a 53 y.o. female with history of GERD, SVTs on metoprolol presents to the emergency department with chief complaint of chest pain.  Pain 1st started last night around 11:30 p.m..  Patient felt like she was in SVT rhythm at that time.  Patient took her breakthrough metoprolol as prescribed by her cardiologist for when she has breakthrough episodes of SVT.  However, did not relieve the pain.  The pain was a substernal chest pain that radiated down the left arm in the left side of the neck.  Patient was denies any nausea, vomiting, shortness of breath, diarrhea, urinary symptoms or any other symptoms at this time.  Patient was denies any history of blood clots, DVTs, PEs, or any risk factors for clots.S/P one time adenosine 6 mg with conversion to sinus,patient has elevated LFT,she admit consuming alcohol,last alcohol drink was Saturday,few beers,she has elevated troponin 0.103,patient was placed in observation with cardiology consolations.she say she has just some soreness on left side but no chest pain.

## 2024-05-13 NOTE — ASSESSMENT & PLAN NOTE
Peak troponin 0.1. Likely demand ischemia from SVT. EF normal. Stress test negative for ischemia. Ok for d/c

## 2024-05-13 NOTE — H&P
"  [facility]  [facility]  History & Physical    Patient Name: Awa Vargas  MRN: [id number]  Patient Class: IP- Inpatient  Admission Date: 5/13/2024  Attending Physician: Catie Gardiner, *   Primary Care Provider: [facility]         Patient information was obtained from patient and ER records.     Subjective:     Principal Problem:Elevated troponin    Chief Complaint:   Chief Complaint   Patient presents with    Chest Pain     Patient presents to ED via  EMS Unit 14 secondary to constant chest pain beginning at 2300. Pain to left chest and radiates to left shoulder and described as "Pressure." Upon EMS arrival, patient was in SVT with a rate of 170's. Received 6mg of adenosine and converted to sinus rhythm in the 80's. Also had 300 cc of normal saline infused while en route.         HPI: Awa Vargas is a 53 y.o. female with history of GERD, SVTs on metoprolol presents to the emergency department with chief complaint of chest pain.  Pain 1st started last night around 11:30 p.m..  Patient felt like she was in SVT rhythm at that time.  Patient took her breakthrough metoprolol as prescribed by her cardiologist for when she has breakthrough episodes of SVT.  However, did not relieve the pain.  The pain was a substernal chest pain that radiated down the left arm in the left side of the neck.  Patient was denies any nausea, vomiting, shortness of breath, diarrhea, urinary symptoms or any other symptoms at this time.  Patient was denies any history of blood clots, DVTs, PEs, or any risk factors for clots.S/P one time adenosine 6 mg with conversion to sinus,patient has elevated LFT,she admit consuming alcohol,last alcohol drink was Saturday,few beers,she has elevated troponin 0.103,patient was placed in observation with cardiology consolations.she say she has just some soreness on left side but no chest pain.    Past Medical History:   Diagnosis Date    " Calculus of gallbladder     Chronic gastritis 08/28/2017    H. pylori infection     Hyperbilirubinemia 06/15/2017    Hypertension     Obesity     SOB (shortness of breath)        Past Surgical History:   Procedure Laterality Date    APPENDECTOMY      CHOLECYSTECTOMY N/A 08/29/2017    COLONOSCOPY N/A 12/15/2021    Procedure: COLONOSCOPY;  Surgeon: Leigh East MD;  Location: [facility];  Service: General;  Laterality: N/A;  3  0700    WISDOM TOOTH EXTRACTION         Review of patient's allergies indicates:  No Known Allergies    No current facility-administered medications on file prior to encounter.     Current Outpatient Medications on File Prior to Encounter   Medication Sig    EScitalopram oxalate (LEXAPRO) 20 MG tablet Take 1 tablet (20 mg total) by mouth once daily.    fluticasone propionate (FLONASE) 50 mcg/actuation nasal spray 2 sprays by Each Nostril route once daily.    hydrOXYzine pamoate (VISTARIL) 25 MG Cap Take 1 capsule (25 mg total) by mouth 2 (two) times daily as needed (for anxiety). (Patient taking differently: Take 25 mg by mouth once daily.)    losartan (COZAAR) 25 MG tablet Take 1 tablet (25 mg total) by mouth once daily.    meloxicam (MOBIC) 7.5 MG tablet     metoprolol succinate (TOPROL-XL) 25 MG 24 hr tablet Take 1 tablet (25 mg total) by mouth daily as needed (As needed for palpitations).     Family History       Problem Relation (Age of Onset)    Breast cancer Father, Paternal Aunt, Paternal Uncle, Paternal Grandmother    Heart attack Father (62)    Lung cancer Maternal Grandmother    No Known Problems Mother, Sister, Daughter, Maternal Aunt, Maternal Uncle, Maternal Grandfather, Paternal Grandfather          Tobacco Use    Smoking status: Never    Smokeless tobacco: Never   Substance and Sexual Activity    Alcohol use: Yes     Comment: socially     Drug use: No    Sexual activity: Not Currently     Partners: Male     Comment:      Review of Systems   Constitutional:  Positive  for activity change and appetite change.   HENT:  Negative for congestion and dental problem.    Eyes:  Negative for discharge and itching.   Respiratory:  Negative for apnea and chest tightness.    Cardiovascular:  Positive for palpitations. Negative for chest pain.   Gastrointestinal:  Negative for abdominal distention.   Endocrine: Negative for cold intolerance and heat intolerance.   Genitourinary:  Negative for difficulty urinating and dysuria.   Musculoskeletal:  Negative for arthralgias and back pain.   Skin:  Negative for color change and pallor.   Neurological:  Negative for dizziness.   Hematological:  Negative for adenopathy.   Psychiatric/Behavioral:  Negative for agitation.      Objective:     Vital Signs (Most Recent):  Temp: 98.4 °F (36.9 °C) (05/13/24 0452)  Pulse: 80 (05/13/24 0703)  Resp: 18 (05/13/24 0703)  BP: 116/76 (05/13/24 0702)  SpO2: 95 % (05/13/24 0703) Vital Signs (24h Range):  Temp:  [98.4 °F (36.9 °C)] 98.4 °F (36.9 °C)  Pulse:  [80-88] 80  Resp:  [17-18] 18  SpO2:  [94 %-98 %] 95 %  BP: (104-126)/(68-78) 116/76     Weight: 69.4 kg (153 lb)  Body mass index is 26.26 kg/m².     Physical Exam  HENT:      Head: Normocephalic.      Nose: Nose normal.      Mouth/Throat:      Mouth: Mucous membranes are moist.   Eyes:      Extraocular Movements: Extraocular movements intact.      Pupils: Pupils are equal, round, and reactive to light.   Cardiovascular:      Rate and Rhythm: Normal rate and regular rhythm.      Pulses: Normal pulses.   Pulmonary:      Effort: No respiratory distress.   Abdominal:      General: Abdomen is flat. There is no distension.      Palpations: Abdomen is soft.   Musculoskeletal:         General: No swelling or deformity.      Cervical back: Normal range of motion. No tenderness.   Skin:     Coloration: Skin is not jaundiced.   Neurological:      Mental Status: She is alert and oriented to person, place, and time.      Cranial Nerves: No cranial nerve deficit.       Motor: No weakness.   Psychiatric:         Mood and Affect: Mood normal.         Behavior: Behavior normal.              CRANIAL NERVES     CN III, IV, VI   Pupils are equal, round, and reactive to light.       Significant Labs: All pertinent labs within the past 24 hours have been reviewed.  BMP:   Recent Labs   Lab 05/13/24  0502   *      K 4.1      CO2 23   BUN 24*   CREATININE 0.8   CALCIUM 9.1     CBC:   Recent Labs   Lab 05/13/24  0502   WBC 7.05   HGB 13.4   HCT 42.0        CMP:   Recent Labs   Lab 05/13/24  0502      K 4.1      CO2 23   *   BUN 24*   CREATININE 0.8   CALCIUM 9.1   PROT 7.0   ALBUMIN 3.7   BILITOT 0.4   ALKPHOS 76   *   ALT 99*   ANIONGAP 11       Significant Imaging: I have reviewed all pertinent imaging results/findings within the past 24 hours.  Assessment/Plan:     * Elevated troponin  Will monitor  and check NST,cardiology consult.      SVT (supraventricular tachycardia)  Convert to sinus with one time adenosine,recent Echo. Was unremarkable,check TSH,started on BB.will do NST,also had recent Holter monitoring.      Transaminitis  Will monitor,duo to alcohol ?      Alcohol abuse  Consulted avoid alcohol consumption,no sign of DT.        VTE Risk Mitigation (From admission, onward)      None                       AdmissionCare    Guideline: Myocardial Infarction, Inpatient    Based on the indications selected for the patient, the bed status of Inpatient was determined to be MET    The following indications were selected as present at the time of evaluation of the patient:      - Acute myocardial infarction (MI) (not in context of cardiac procedure within last 48 hours), as indicated by ALL of the following:    - Elevated cardiac troponin level, as indicated by 1 or more of the following:     - New or presumed new elevation of cardiac troponin level (ie, elevation clearly not due to chronic condition)     - Initial troponin elevated with  subsequent increase or decrease in level of 20% or more (ie, indicative of acute myocardial injury)    - Myocardial injury due to acute ischemia, as indicated by 1 or more of the following:     - Symptoms consistent with myocardial ischemia (eg, chest pain, dyspnea)    AdmissionCare documentation entered by: Aysha Ybarra    [facility], 27th edition, Copyright © 2023 [facility] [employer], [facility] All Rights Reserved.  [id number]:00    Catie Gardiner MD  Department of Hospital Medicine  [facility] - Emergency Dept

## 2024-05-13 NOTE — SUBJECTIVE & OBJECTIVE
Past Medical History:   Diagnosis Date    Calculus of gallbladder     Chronic gastritis 08/28/2017    H. pylori infection     Hyperbilirubinemia 06/15/2017    Hypertension     Obesity     SOB (shortness of breath)        Past Surgical History:   Procedure Laterality Date    APPENDECTOMY      CHOLECYSTECTOMY N/A 08/29/2017    COLONOSCOPY N/A 12/15/2021    Procedure: COLONOSCOPY;  Surgeon: Leigh East MD;  Location: [facility];  Service: General;  Laterality: N/A;  3  0700    WISDOM TOOTH EXTRACTION         Review of patient's allergies indicates:  No Known Allergies    No current facility-administered medications on file prior to encounter.     Current Outpatient Medications on File Prior to Encounter   Medication Sig    EScitalopram oxalate (LEXAPRO) 20 MG tablet Take 1 tablet (20 mg total) by mouth once daily.    fluticasone propionate (FLONASE) 50 mcg/actuation nasal spray 2 sprays by Each Nostril route once daily.    hydrOXYzine pamoate (VISTARIL) 25 MG Cap Take 1 capsule (25 mg total) by mouth 2 (two) times daily as needed (for anxiety). (Patient taking differently: Take 25 mg by mouth once daily.)    losartan (COZAAR) 25 MG tablet Take 1 tablet (25 mg total) by mouth once daily.    meloxicam (MOBIC) 7.5 MG tablet     metoprolol succinate (TOPROL-XL) 25 MG 24 hr tablet Take 1 tablet (25 mg total) by mouth daily as needed (As needed for palpitations).     Family History       Problem Relation (Age of Onset)    Breast cancer Father, Paternal Aunt, Paternal Uncle, Paternal Grandmother    Heart attack Father (62)    Lung cancer Maternal Grandmother    No Known Problems Mother, Sister, Daughter, Maternal Aunt, Maternal Uncle, Maternal Grandfather, Paternal Grandfather          Tobacco Use    Smoking status: Never    Smokeless tobacco: Never   Substance and Sexual Activity    Alcohol use: Yes     Comment: socially     Drug use: No    Sexual activity: Not Currently     Partners: Male     Comment:       Review of Systems   Constitutional:  Positive for activity change and appetite change.   HENT:  Negative for congestion and dental problem.    Eyes:  Negative for discharge and itching.   Respiratory:  Negative for apnea and chest tightness.    Cardiovascular:  Positive for palpitations. Negative for chest pain.   Gastrointestinal:  Negative for abdominal distention.   Endocrine: Negative for cold intolerance and heat intolerance.   Genitourinary:  Negative for difficulty urinating and dysuria.   Musculoskeletal:  Negative for arthralgias and back pain.   Skin:  Negative for color change and pallor.   Neurological:  Negative for dizziness.   Hematological:  Negative for adenopathy.   Psychiatric/Behavioral:  Negative for agitation.      Objective:     Vital Signs (Most Recent):  Temp: 98.4 °F (36.9 °C) (05/13/24 0452)  Pulse: 80 (05/13/24 0703)  Resp: 18 (05/13/24 0703)  BP: 116/76 (05/13/24 0702)  SpO2: 95 % (05/13/24 0703) Vital Signs (24h Range):  Temp:  [98.4 °F (36.9 °C)] 98.4 °F (36.9 °C)  Pulse:  [80-88] 80  Resp:  [17-18] 18  SpO2:  [94 %-98 %] 95 %  BP: (104-126)/(68-78) 116/76     Weight: 69.4 kg (153 lb)  Body mass index is 26.26 kg/m².     Physical Exam  HENT:      Head: Normocephalic.      Nose: Nose normal.      Mouth/Throat:      Mouth: Mucous membranes are moist.   Eyes:      Extraocular Movements: Extraocular movements intact.      Pupils: Pupils are equal, round, and reactive to light.   Cardiovascular:      Rate and Rhythm: Normal rate and regular rhythm.      Pulses: Normal pulses.   Pulmonary:      Effort: No respiratory distress.   Abdominal:      General: Abdomen is flat. There is no distension.      Palpations: Abdomen is soft.   Musculoskeletal:         General: No swelling or deformity.      Cervical back: Normal range of motion. No tenderness.   Skin:     Coloration: Skin is not jaundiced.   Neurological:      Mental Status: She is alert and oriented to person, place, and time.       Cranial Nerves: No cranial nerve deficit.      Motor: No weakness.   Psychiatric:         Mood and Affect: Mood normal.         Behavior: Behavior normal.              CRANIAL NERVES     CN III, IV, VI   Pupils are equal, round, and reactive to light.       Significant Labs: All pertinent labs within the past 24 hours have been reviewed.  BMP:   Recent Labs   Lab 05/13/24  0502   *      K 4.1      CO2 23   BUN 24*   CREATININE 0.8   CALCIUM 9.1     CBC:   Recent Labs   Lab 05/13/24  0502   WBC 7.05   HGB 13.4   HCT 42.0        CMP:   Recent Labs   Lab 05/13/24  0502      K 4.1      CO2 23   *   BUN 24*   CREATININE 0.8   CALCIUM 9.1   PROT 7.0   ALBUMIN 3.7   BILITOT 0.4   ALKPHOS 76   *   ALT 99*   ANIONGAP 11       Significant Imaging: I have reviewed all pertinent imaging results/findings within the past 24 hours.

## 2024-05-13 NOTE — SUBJECTIVE & OBJECTIVE
Past Medical History:   Diagnosis Date    Calculus of gallbladder     Chronic gastritis 08/28/2017    H. pylori infection     Hyperbilirubinemia 06/15/2017    Hypertension     Obesity     SOB (shortness of breath)        Past Surgical History:   Procedure Laterality Date    APPENDECTOMY      CHOLECYSTECTOMY N/A 08/29/2017    COLONOSCOPY N/A 12/15/2021    Procedure: COLONOSCOPY;  Surgeon: Leigh East MD;  Location: [facility];  Service: General;  Laterality: N/A;  3  0700    WISDOM TOOTH EXTRACTION         Review of patient's allergies indicates:  No Known Allergies    No current facility-administered medications on file prior to encounter.     Current Outpatient Medications on File Prior to Encounter   Medication Sig    EScitalopram oxalate (LEXAPRO) 20 MG tablet Take 1 tablet (20 mg total) by mouth once daily.    fluticasone propionate (FLONASE) 50 mcg/actuation nasal spray 2 sprays by Each Nostril route once daily.    losartan (COZAAR) 25 MG tablet Take 1 tablet (25 mg total) by mouth once daily.    meloxicam (MOBIC) 7.5 MG tablet Take 7.5 mg by mouth.    metoprolol succinate (TOPROL-XL) 25 MG 24 hr tablet Take 1 tablet (25 mg total) by mouth daily as needed (As needed for palpitations).    metoprolol succinate (TOPROL-XL) 50 MG 24 hr tablet Take 50 mg by mouth once daily.    hydrOXYzine pamoate (VISTARIL) 25 MG Cap Take 1 capsule (25 mg total) by mouth 2 (two) times daily as needed (for anxiety). (Patient not taking: Reported on 5/13/2024)     Family History       Problem Relation (Age of Onset)    Breast cancer Father, Paternal Aunt, Paternal Uncle, Paternal Grandmother    Heart attack Father (62)    Lung cancer Maternal Grandmother    No Known Problems Mother, Sister, Daughter, Maternal Aunt, Maternal Uncle, Maternal Grandfather, Paternal Grandfather          Tobacco Use    Smoking status: Never    Smokeless tobacco: Never   Substance and Sexual Activity    Alcohol use: Yes     Comment: socially     Drug  use: No    Sexual activity: Not Currently     Partners: Male     Comment:      Review of Systems   Constitutional: Negative for decreased appetite.   HENT:  Negative for ear discharge.    Eyes:  Negative for blurred vision.   Respiratory:  Negative for hemoptysis.    Endocrine: Negative for polyphagia.   Hematologic/Lymphatic: Negative for adenopathy.   Skin:  Negative for color change.   Musculoskeletal:  Negative for joint swelling.   Genitourinary:  Negative for bladder incontinence.   Neurological:  Negative for brief paralysis.   Psychiatric/Behavioral:  Negative for hallucinations.    Allergic/Immunologic: Negative for hives.     Objective:     Vital Signs (Most Recent):  Temp: 98.4 °F (36.9 °C) (05/13/24 1351)  Pulse: 73 (05/13/24 1351)  Resp: 18 (05/13/24 1351)  BP: 135/86 (05/13/24 1351)  SpO2: 100 % (05/13/24 1351) Vital Signs (24h Range):  Temp:  [98.4 °F (36.9 °C)] 98.4 °F (36.9 °C)  Pulse:  [73-88] 73  Resp:  [17-18] 18  SpO2:  [94 %-100 %] 100 %  BP: (104-135)/(68-86) 135/86     Weight: 69.4 kg (153 lb)  Body mass index is 26.26 kg/m².    SpO2: 100 %       No intake or output data in the 24 hours ending 05/13/24 1451    Lines/Drains/Airways       Peripheral Intravenous Line  Duration                  Peripheral IV - Single Lumen 05/13/24 0453 20 G Posterior;Right Hand <1 day         Peripheral IV - Single Lumen 05/13/24 0503 20 G Left Antecubital <1 day                     Physical Exam  Constitutional:       Appearance: She is well-developed.   HENT:      Head: Normocephalic and atraumatic.   Eyes:      Conjunctiva/sclera: Conjunctivae normal.      Pupils: Pupils are equal, round, and reactive to light.   Cardiovascular:      Rate and Rhythm: Normal rate.      Pulses: Intact distal pulses.      Heart sounds: Normal heart sounds.   Pulmonary:      Effort: Pulmonary effort is normal.      Breath sounds: Normal breath sounds.   Abdominal:      General: Bowel sounds are normal.      Palpations:  Abdomen is soft.   Musculoskeletal:         General: Normal range of motion.      Cervical back: Normal range of motion and neck supple.   Skin:     General: Skin is warm and dry.   Neurological:      Mental Status: She is alert and oriented to person, place, and time.          Significant Labs: All pertinent lab results from the last 24 hours have been reviewed.    Significant Imaging: Echocardiogram: 2D echo with color flow doppler: No results found for this or any previous visit.

## 2024-05-13 NOTE — ASSESSMENT & PLAN NOTE
Convert to sinus with one time adenosine,recent Echo. Was unremarkable,check TSH,started on BB.will do NST,also had recent Holter monitoring.

## 2024-05-13 NOTE — HPI
HPI: Awa Vargas is a 53 y.o. female with history of GERD, SVTs on metoprolol presents to the emergency department with chief complaint of chest pain.  Pain 1st started last night around 11:30 p.m..  Patient felt like she was in SVT rhythm at that time.  Patient took her breakthrough metoprolol as prescribed by her cardiologist for when she has breakthrough episodes of SVT.  However, did not relieve the pain.  The pain was a substernal chest pain that radiated down the left arm in the left side of the neck.  Patient was denies any nausea, vomiting, shortness of breath, diarrhea, urinary symptoms or any other symptoms at this time.  Patient was denies any history of blood clots, DVTs, PEs, or any risk factors for clots.S/P one time adenosine 6 mg with conversion to sinus,patient has elevated LFT,she admit consuming alcohol,last alcohol drink was Saturday,few beers,she has elevated troponin 0.103,patient was placed in observation with cardiology consolations.she say she has just some soreness on left side but no chest pain.     Currently denies CP or SOB  EKG NSR - ok  Troponin 0.07 to 0.1    Stress test 5/13/24    Normal myocardial perfusion scan. There is no evidence of myocardial ischemia or infarction.    The gated perfusion images showed an ejection fraction of 65% post stress.    The ECG portion of the study is negative for ischemia.    The patient reported no chest pain during the stress test.    There were no arrhythmias during stress.    Echo 5/13/24    Left Ventricle: The left ventricle is normal in size. There is normal systolic function with a visually estimated ejection fraction of 60 - 65%.    Right Ventricle: Normal right ventricular cavity size. Systolic function is normal.    Tricuspid Valve: There is mild regurgitation.    Pulmonary Artery: The estimated pulmonary artery systolic pressure is 22 mmHg.    IVC/SVC: Normal venous pressure at 3 mmHg.     Followed at  [facility]    [facility]  Alicia  has a past medical history of Calculus of gallbladder, Chronic gastritis (08/28/2017), H. pylori infection, Hyperbilirubinemia (06/15/2017), Hypertension, Obesity, and SOB (shortness of breath).     ED visit 1/24/24 for pre-syncopal episode while driving  Prior to episode, patient reported chest pain and elevated HR in the 180s  Per chart review, EMS stated HR returned to 80s after bearing down  Does have a history of SVT and takes Toprol 25 mg daily, recently increased to 50 mg  Her ED workup was essentially negative with the exception of mildly elevated troponin at 0.040     Patient accompanied with daughter today and is here for follow-up  Had normal Holter monitor and echocardiogram revealing normal EF February 2024  Toprol recently increased to 50 mg, however she tells me she abruptly stopped her Lexapro and has increased anxiety and depression   She she notes a 1 time occurrence of palpitations since last visit lasting a few minutes since and resolving     Hypertension; losartan 25 mg and Toprol 50 mg  Tells me blood pressures is 110s to 120s at home  Blood pressure 138/88 today     Elevated troponin; in the setting of possible SVT

## 2024-05-13 NOTE — ED PROVIDER NOTES
"Source of History:  Patient and chart    Chief complaint:  Chest Pain (Patient presents to ED via  EMS Unit 14 secondary to constant chest pain beginning at 2300. Pain to left chest and radiates to left shoulder and described as "Pressure." Upon EMS arrival, patient was in SVT with a rate of 170's. Received 6mg of adenosine and converted to sinus rhythm in the 80's. Also had 300 cc of normal saline infused while en route. )      HPI:  Awa Vargas is a 53 y.o. female with history of GERD, SVTs on metoprolol presents to the emergency department with chief complaint of chest pain.  Pain 1st started last night around 11:30 p.m..  Patient felt like she was in SVT rhythm at that time.  Patient took her breakthrough metoprolol as prescribed by her cardiologist for when she has breakthrough episodes of SVT.  However, did not relieve the pain.  The pain was a substernal chest pain that radiated down the left arm in the left side of the neck.  Patient was denies any nausea, vomiting, shortness of breath, diarrhea, urinary symptoms or any other symptoms at this time.  Patient was denies any history of blood clots, DVTs, PEs, or any risk factors for clots.  She has no further concerns at this time.    Review of patient's allergies indicates:  No Known Allergies    No current facility-administered medications on file prior to encounter.     Current Outpatient Medications on File Prior to Encounter   Medication Sig Dispense Refill    EScitalopram oxalate (LEXAPRO) 20 MG tablet Take 1 tablet (20 mg total) by mouth once daily. 90 tablet 3    fluticasone propionate (FLONASE) 50 mcg/actuation nasal spray 2 sprays by Each Nostril route once daily.      hydrOXYzine pamoate (VISTARIL) 25 MG Cap Take 1 capsule (25 mg total) by mouth 2 (two) times daily as needed (for anxiety). (Patient taking differently: Take 25 mg by mouth once daily.) 30 capsule 1    losartan (COZAAR) 25 MG tablet Take 1 tablet (25 mg total) by mouth once " daily. 90 tablet 3    meloxicam (MOBIC) 7.5 MG tablet       metoprolol succinate (TOPROL-XL) 25 MG 24 hr tablet Take 1 tablet (25 mg total) by mouth daily as needed (As needed for palpitations). 30 tablet 11       PMH:  As per HPI and below:  Past Medical History:   Diagnosis Date    Calculus of gallbladder     Chronic gastritis 08/28/2017    H. pylori infection     Hyperbilirubinemia 06/15/2017    Hypertension     Obesity     SOB (shortness of breath)      Past Surgical History:   Procedure Laterality Date    APPENDECTOMY      CHOLECYSTECTOMY N/A 08/29/2017    COLONOSCOPY N/A 12/15/2021    Procedure: COLONOSCOPY;  Surgeon: Leigh East MD;  Location: [facility];  Service: General;  Laterality: N/A;  3  0700    WISDOM TOOTH EXTRACTION         Social History     Socioeconomic History    Marital status: Single   Tobacco Use    Smoking status: Never    Smokeless tobacco: Never   Substance and Sexual Activity    Alcohol use: Yes     Comment: socially     Drug use: No    Sexual activity: Not Currently     Partners: Male     Comment:        Family History   Problem Relation Name Age of Onset    No Known Problems Mother      Heart attack Father  62    Breast cancer Father      Lung cancer Maternal Grandmother      Breast cancer Paternal Aunt      Breast cancer Paternal Uncle      Breast cancer Paternal Grandmother      No Known Problems Sister      No Known Problems Daughter      No Known Problems Maternal Aunt      No Known Problems Maternal Uncle      No Known Problems Maternal Grandfather      No Known Problems Paternal Grandfather      Ovarian cancer Neg Hx      BRCA 1/2 Neg Hx         Physical Exam:      Vitals:    05/13/24 0703   BP:    Pulse: 80   Resp: 18   Temp:      Physical Exam  Gen:  Hemodynamically stable in no acute distress  Mental Status:  Alert and oriented x3.  Appropriate conversant.    Skin: Warm, dry. No rashes seen.  Eyes: No conjunctival injection.  Pulm: CTAB. No increased work of  breathing.  No significant tachypnea.  No conversational dyspnea.    CV: Regular rate.   Abd: Soft.  Not distended.  Nontender.   MSK: No deformities.  No edema.  No posterior calf or popliteal tenderness.  No calor or rubor.  Neuro: Awake. Speech normal. No focal neuro deficit observed.      Procedures  Laboratory Studies:  Labs Reviewed   CBC W/ AUTO DIFFERENTIAL - Abnormal; Notable for the following components:       Result Value    MCH 31.1 (*)     MCHC 31.9 (*)     MPV 9.1 (*)     All other components within normal limits   COMPREHENSIVE METABOLIC PANEL - Abnormal; Notable for the following components:    Glucose 130 (*)     BUN 24 (*)      (*)     ALT 99 (*)     All other components within normal limits   B-TYPE NATRIURETIC PEPTIDE - Abnormal; Notable for the following components:     (*)     All other components within normal limits   TROPONIN I - Abnormal; Notable for the following components:    Troponin I 0.073 (*)     All other components within normal limits   TROPONIN I - Abnormal; Notable for the following components:    Troponin I 0.103 (*)     All other components within normal limits       EKG (independently interpreted by me):  Normal sinus rhythm.  Normal axis.  Normal intervals.  No ischemic changes.  No STEMI    X-rays (independently interpreted by me):  No consolidation, effusion, edema or pneumothorax apparent    Chart reviewed.  Echo performed February of 2024 showed ejection fraction of 55-60%.    Imaging Results              X-Ray Chest PA And Lateral (Final result)  Result time 05/13/24 06:25:38      Final result by Joe Ardon MD (05/13/24 06:25:38)                   Impression:      Coarse interstitial lung markings, potentially related to pneumonitis or interstitial edema.  No confluent area of consolidation identified.      Electronically signed by: Joe Ardon MD  Date:    05/13/2024  Time:    06:25               Narrative:    EXAMINATION:  XR CHEST PA AND  "LATERAL    CLINICAL HISTORY:  Provided history is "Chest Pain;  ".    TECHNIQUE:  Frontal and lateral views of the chest were performed.    COMPARISON:  01/24/2024.    FINDINGS:  Cardiac wires overlie the chest.  Cardiomediastinal silhouette is not enlarged.  There are coarse interstitial lung markings with bilateral interstitial opacities.  No confluent area of consolidation.  No large pleural effusion.  No pneumothorax.                                      Medications Given:  Medications   aspirin tablet 325 mg (325 mg Oral Given 5/13/24 0631)       Discussed with:  Cardiology.  They were okay with the patient being discharged as long as 2nd troponin was trending down.  However, that troponin was trending up.  Therefore discussed the case with hospital medicine who will admit the patient to their service for serial troponins.    MDM:    53 y.o. female with chest pain    Differential includes ACS, CHF exacerbation, PE    The patient is currently pain-free.    I have considered but think it was unlikely to be a PE.  Patient was not tachycardic.  She was no risk factors for clots.  She was no history for clots.  Physical exam and history not consistent with PE.    Initial trope is elevated above normal baseline.  BNP is elevated.  I am uncertain with a BNP baseline for this patient was.    AST and ALT are elevated.  Uncertain clinical relevance.  Patient has history of cholecystectomy.  She was nontender to palpation in her right upper quadrant.  Negative Gamboa's sign.    Workup has been largely benign.  The patient remains asymptomatic.  However, that is 2nd troponin was trending upwards.  Therefore, we will admit the patient for serial troponins and further cardiac workup.  Patient was safe and stable for admission.  She is asymptomatic at this time.      Medical Decision Making  Risk  Decision regarding hospitalization.         Diagnostic Impression:    1. Chest pain    2. Elevated troponin             ED " Disposition Condition    Admit                Patient and/or family understands the plan and is in agreement, verbalized understanding, questions answered    MARI Willis MD  Resident  Emergency Medicine    Resident supervising staff physician attestation:  This is doctor Ashraf dictating.  I examined this patient at 7:10 a.m..  The patient is awake alert bright.  She is asymptomatic at this time.  She has some tenderness of the left pectoral chest.  Vital signs are stable.  Her blood pressure is 116/76 her heart rate is 80 she is in sinus rhythm.  The lungs are clear in the heart tones are normal.  I agree with the resident documentation diagnostic and treatment plan.  This is doctor Ashraf dictating.       Des Willis MD  Resident  05/13/24 2523

## 2024-05-13 NOTE — CONSULTS
[facility] Surg  Cardiology  Consult Note    Patient Name: Awa Vargas  MRN: [id number]  Admission Date: 5/13/2024  Hospital Length of Stay: 0 days  Code Status: Full Code   Attending Provider: Catie Gardiner, *   Consulting Provider: Waylon Aguiar MD  Primary Care Physician: [place] [place] [facility]  Principal Problem:Elevated troponin    Patient information was obtained from patient and ER records.     Inpatient consult to Cardiology  Consult performed by: Waylon Aguiar MD  Consult ordered by: Catie Gardiner MD        Subjective:     Chief Complaint:  SVT, elevated troponin         HPI: Awa Vargas is a 53 y.o. female with history of GERD, SVTs on metoprolol presents to the emergency department with chief complaint of chest pain.  Pain 1st started last night around 11:30 p.m..  Patient felt like she was in SVT rhythm at that time.  Patient took her breakthrough metoprolol as prescribed by her cardiologist for when she has breakthrough episodes of SVT.  However, did not relieve the pain.  The pain was a substernal chest pain that radiated down the left arm in the left side of the neck.  Patient was denies any nausea, vomiting, shortness of breath, diarrhea, urinary symptoms or any other symptoms at this time.  Patient was denies any history of blood clots, DVTs, PEs, or any risk factors for clots.S/P one time adenosine 6 mg with conversion to sinus,patient has elevated LFT,she admit consuming alcohol,last alcohol drink was Saturday,few beers,she has elevated troponin 0.103,patient was placed in observation with cardiology consolations.she say she has just some soreness on left side but no chest pain.     Currently denies CP or SOB  EKG NSR - ok  Troponin 0.07 to 0.1    Stress test 5/13/24    Normal myocardial perfusion scan. There is no evidence of myocardial ischemia or infarction.    The gated perfusion images showed an ejection fraction of 65% post stress.     The ECG portion of the study is negative for ischemia.    The patient reported no chest pain during the stress test.    There were no arrhythmias during stress.    Echo 5/13/24    Left Ventricle: The left ventricle is normal in size. There is normal systolic function with a visually estimated ejection fraction of 60 - 65%.    Right Ventricle: Normal right ventricular cavity size. Systolic function is normal.    Tricuspid Valve: There is mild regurgitation.    Pulmonary Artery: The estimated pulmonary artery systolic pressure is 22 mmHg.    IVC/SVC: Normal venous pressure at 3 mmHg.     Followed at [facility]    Molly Vargas  has a past medical history of Calculus of gallbladder, Chronic gastritis (08/28/2017), H. pylori infection, Hyperbilirubinemia (06/15/2017), Hypertension, Obesity, and SOB (shortness of breath).     ED visit 1/24/24 for pre-syncopal episode while driving  Prior to episode, patient reported chest pain and elevated HR in the 180s  Per chart review, EMS stated HR returned to 80s after bearing down  Does have a history of SVT and takes Toprol 25 mg daily, recently increased to 50 mg  Her ED workup was essentially negative with the exception of mildly elevated troponin at 0.040     Patient accompanied with daughter today and is here for follow-up  Had normal Holter monitor and echocardiogram revealing normal EF February 2024  Toprol recently increased to 50 mg, however she tells me she abruptly stopped her Lexapro and has increased anxiety and depression   She she notes a 1 time occurrence of palpitations since last visit lasting a few minutes since and resolving     Hypertension; losartan 25 mg and Toprol 50 mg  Tells me blood pressures is 110s to 120s at home  Blood pressure 138/88 today     Elevated troponin; in the setting of possible SVT    Past Medical History:   Diagnosis Date    Calculus of gallbladder     Chronic gastritis 08/28/2017    H. pylori infection     Hyperbilirubinemia  06/15/2017    Hypertension     Obesity     SOB (shortness of breath)        Past Surgical History:   Procedure Laterality Date    APPENDECTOMY      CHOLECYSTECTOMY N/A 08/29/2017    COLONOSCOPY N/A 12/15/2021    Procedure: COLONOSCOPY;  Surgeon: Leigh East MD;  Location: [facility];  Service: General;  Laterality: N/A;  3  0700    WISDOM TOOTH EXTRACTION         Review of patient's allergies indicates:  No Known Allergies    No current facility-administered medications on file prior to encounter.     Current Outpatient Medications on File Prior to Encounter   Medication Sig    EScitalopram oxalate (LEXAPRO) 20 MG tablet Take 1 tablet (20 mg total) by mouth once daily.    fluticasone propionate (FLONASE) 50 mcg/actuation nasal spray 2 sprays by Each Nostril route once daily.    losartan (COZAAR) 25 MG tablet Take 1 tablet (25 mg total) by mouth once daily.    meloxicam (MOBIC) 7.5 MG tablet Take 7.5 mg by mouth.    metoprolol succinate (TOPROL-XL) 25 MG 24 hr tablet Take 1 tablet (25 mg total) by mouth daily as needed (As needed for palpitations).    metoprolol succinate (TOPROL-XL) 50 MG 24 hr tablet Take 50 mg by mouth once daily.    hydrOXYzine pamoate (VISTARIL) 25 MG Cap Take 1 capsule (25 mg total) by mouth 2 (two) times daily as needed (for anxiety). (Patient not taking: Reported on 5/13/2024)     Family History       Problem Relation (Age of Onset)    Breast cancer Father, Paternal Aunt, Paternal Uncle, Paternal Grandmother    Heart attack Father (62)    Lung cancer Maternal Grandmother    No Known Problems Mother, Sister, Daughter, Maternal Aunt, Maternal Uncle, Maternal Grandfather, Paternal Grandfather          Tobacco Use    Smoking status: Never    Smokeless tobacco: Never   Substance and Sexual Activity    Alcohol use: Yes     Comment: socially     Drug use: No    Sexual activity: Not Currently     Partners: Male     Comment:      Review of Systems   Constitutional: Negative for decreased  appetite.   HENT:  Negative for ear discharge.    Eyes:  Negative for blurred vision.   Respiratory:  Negative for hemoptysis.    Endocrine: Negative for polyphagia.   Hematologic/Lymphatic: Negative for adenopathy.   Skin:  Negative for color change.   Musculoskeletal:  Negative for joint swelling.   Genitourinary:  Negative for bladder incontinence.   Neurological:  Negative for brief paralysis.   Psychiatric/Behavioral:  Negative for hallucinations.    Allergic/Immunologic: Negative for hives.     Objective:     Vital Signs (Most Recent):  Temp: 98.4 °F (36.9 °C) (05/13/24 1351)  Pulse: 73 (05/13/24 1351)  Resp: 18 (05/13/24 1351)  BP: 135/86 (05/13/24 1351)  SpO2: 100 % (05/13/24 1351) Vital Signs (24h Range):  Temp:  [98.4 °F (36.9 °C)] 98.4 °F (36.9 °C)  Pulse:  [73-88] 73  Resp:  [17-18] 18  SpO2:  [94 %-100 %] 100 %  BP: (104-135)/(68-86) 135/86     Weight: 69.4 kg (153 lb)  Body mass index is 26.26 kg/m².    SpO2: 100 %       No intake or output data in the 24 hours ending 05/13/24 1451    Lines/Drains/Airways       Peripheral Intravenous Line  Duration                  Peripheral IV - Single Lumen 05/13/24 0453 20 G Posterior;Right Hand <1 day         Peripheral IV - Single Lumen 05/13/24 0503 20 G Left Antecubital <1 day                     Physical Exam  Constitutional:       Appearance: She is well-developed.   HENT:      Head: Normocephalic and atraumatic.   Eyes:      Conjunctiva/sclera: Conjunctivae normal.      Pupils: Pupils are equal, round, and reactive to light.   Cardiovascular:      Rate and Rhythm: Normal rate.      Pulses: Intact distal pulses.      Heart sounds: Normal heart sounds.   Pulmonary:      Effort: Pulmonary effort is normal.      Breath sounds: Normal breath sounds.   Abdominal:      General: Bowel sounds are normal.      Palpations: Abdomen is soft.   Musculoskeletal:         General: Normal range of motion.      Cervical back: Normal range of motion and neck supple.   Skin:      General: Skin is warm and dry.   Neurological:      Mental Status: She is alert and oriented to person, place, and time.          Significant Labs: All pertinent lab results from the last 24 hours have been reviewed.    Significant Imaging: Echocardiogram: 2D echo with color flow doppler: No results found for this or any previous visit.  Assessment and Plan:     * Elevated troponin  Peak troponin 0.1. Likely demand ischemia from SVT. EF normal. Stress test negative for ischemia. Ok for d/c    SVT (supraventricular tachycardia)  Broke with adenosine. Continue BB. Out patient EP evaluation for ablation        VTE Risk Mitigation (From admission, onward)      None            Thank you for your consult. I will sign off. Please contact us if you have any additional questions.    Waylon Aguiar MD  Cardiology   [facility] Surg

## 2024-05-14 VITALS
BODY MASS INDEX: 31 KG/M2 | HEIGHT: 62 IN | HEART RATE: 57 BPM | TEMPERATURE: 98 F | RESPIRATION RATE: 16 BRPM | WEIGHT: 168.44 LBS | SYSTOLIC BLOOD PRESSURE: 131 MMHG | DIASTOLIC BLOOD PRESSURE: 75 MMHG | OXYGEN SATURATION: 97 %

## 2024-05-14 PROCEDURE — 25000003 PHARM REV CODE 250: Performed by: HOSPITALIST

## 2024-05-14 RX ORDER — ACETAMINOPHEN 325 MG/1
650 TABLET ORAL EVERY 8 HOURS PRN
Status: DISCONTINUED | OUTPATIENT
Start: 2024-05-14 | End: 2024-05-14 | Stop reason: HOSPADM

## 2024-05-14 RX ADMIN — ACETAMINOPHEN 650 MG: 325 TABLET ORAL at 08:05

## 2024-05-14 RX ADMIN — ESCITALOPRAM OXALATE 20 MG: 10 TABLET ORAL at 08:05

## 2024-05-14 NOTE — PLAN OF CARE
[facility] - Med Surg  Initial Discharge Assessment       Primary Care Provider: [place] [place] [facility]  Case Management Assessment     PCP: See above (Clinic intake stated that patient has not been seen since 2021 and needs to re-establish care)  Pharmacy: [facility] in [place]    Patient Arrived From: home  Existing Help at Home: daughter    Barriers to Discharge: none    Discharge Plan:    A. home   B. home with family        Language Line :  Jose M ID# [id number]     Admission Diagnosis: Elevated troponin [R79.89]  Chest pain [R07.9]    Admission Date: 5/13/2024  Expected Discharge Date: 5/14/2024    Transition of Care Barriers: None    Payor: MEDICAID / Plan: [facility] COMMUNITY PLAN \[facility] [place] [facility]\"" [place] ([place] MED[place]) / Product Type: Managed Medicaid /     Extended Emergency Contact Information  Primary Emergency Contact: Jie Haddad   [place]  Mobile Phone: [phone]  Relation: Daughter    Discharge Plan A: Home  Discharge Plan B: Home with family      [facility] [address]  [address] [facility] [address]  [place] [address]  Phone: [phone] Fax: [phone]      Initial Assessment (most recent)       Adult Discharge Assessment - 05/14/24 1059          Discharge Assessment    Assessment Type Discharge Planning Assessment     Confirmed/corrected address, phone number and insurance Yes     Confirmed Demographics Correct on Facesheet     Source of Information patient; utilized   Jose M ID # [id number]    Communicated IKE with patient/caregiver Yes     Reason For Admission Elevated Toponin     People in Home other relative(s)     Facility Arrived From: Home     Do you expect to return to your current living situation? Yes     Do you have help at home or someone to help you manage your care at home? Yes     Who are your caregiver(s) and their phone number(s)? daughter:  Jie Haddad     Prior to hospitilization cognitive status:  Alert/Oriented     Current cognitive status: Alert/Oriented     Walking or Climbing Stairs Difficulty no     Dressing/Bathing Difficulty no     Equipment Currently Used at Home none     Readmission within 30 days? No     Patient currently being followed by outpatient case management? Unable to determine (comments)     Do you take prescription medications? Yes     Do you have prescription coverage? Yes     Coverage Payor: MEDICAID - [facility] ([place] MEDICA[place]) -     Do you have any problems affording any of your prescribed medications? No     Who is going to help you get home at discharge? Daughter     How do you get to doctors appointments? car, drives self     Are you on dialysis? No     Do you take coumadin? No     Discharge Plan A Home     Discharge Plan B Home with family     DME Needed Upon Discharge  none     Discharge Plan discussed with: Patient     Transition of Care Barriers None

## 2024-05-14 NOTE — PLAN OF CARE
[facility] - Med Surg  Discharge Final Note    Primary Care Provider: [place] [place] [facility] [place]  Case Management Final Discharge Note      Discharge Disposition: Home    New DME ordered / company name: n/a    Relevant SDOH / Transition of Care Barriers:  None    Primary Caretaker and contact information: self    Scheduled followup appointment: Patient to call to schedule appointment at [facility] in [place].  She has not been seen since 2021 and needs to reestablish care.    Referrals placed: Cardiology    Transportation: Daughter.        Patient and family educated on discharge services and updated on DC plan. Bedside RN notified, patient clear to discharge from Case Management Perspective.     Expected Discharge Date: 5/14/2024    Final Discharge Note (most recent)       Final Note - 05/14/24 1105          Final Note    Assessment Type Final Discharge Note     Anticipated Discharge Disposition Home or Self Care     What phone number can be called within the next 1-3 days to see how you are doing after discharge? [phone]     Hospital Resources/Appts/Education Provided Post-Acute resouces added to AVS        Post-Acute Status    Discharge Delays Personal Transportation                     Important Message from Medicare             Contact Info       Cardiology team at [place]        Next Steps: Schedule an appointment as soon as possible for a visit in 1 week(s)    Instructions: For medication adjustment and further evaluation    [facility]   Specialty: Psychology, Internal Medicine, Gynecology, Dental General Practice   Relationship: PCP - General    [address] [facility] [address]   Phone: [phone]       Next Steps: Follow up in 7 day(s)    Instructions: Please call to re-establish care at the clinic.  Request to be seen by 5/21/2024.  (Since you have not been seen at the clinic since 2021 you will have to call to update your information).

## 2024-05-14 NOTE — NURSING
Patient cleared for discharge by  and KESHIA Kinsey.  PIV removed, catheter tip intact, pressure held, dressing placed.  AVS/discharge instructions printed, reviewed with, and given to patient. Patient and daughter verbalized understanding of instructions.  Transport requested for patient. Patient left unit via wheelchair with transport and wife.

## 2024-05-14 NOTE — PLAN OF CARE
Patient AAOx4 throughout shift and able to make needs known. Patient remain on tele monitoring box #[id number]. Patient denies any chest pain or SOB. No acute distress noted, patient free from falls or injury this shift.  Bed in low position, wheels locked, call light in reach for assistance, plan of care continued.          Problem: Adult Inpatient Plan of Care  Goal: Absence of Hospital-Acquired Illness or Injury  Outcome: Progressing  Goal: Optimal Comfort and Wellbeing  Outcome: Progressing  Goal: Readiness for Transition of Care  Outcome: Progressing

## 2024-05-14 NOTE — PLAN OF CARE
Plan of care is ongoing.    Problem: Adult Inpatient Plan of Care  Goal: Plan of Care Review  Outcome: Progressing  Goal: Patient-Specific Goal (Individualized)  Outcome: Progressing  Goal: Absence of Hospital-Acquired Illness or Injury  Outcome: Progressing  Goal: Optimal Comfort and Wellbeing  Outcome: Progressing  Goal: Readiness for Transition of Care  Outcome: Progressing

## 2024-05-14 NOTE — NURSING
AVS virtually reviewed with patient in its entirety via  , #[id number], with emphasis on medications, follow-up appointments and reasons to return to the ED or contact the [facility] On [facility] Nurse Care Line. Patient also encouraged to utilize their patient portal. Ease and convenience of use reiterated. Education complete and patient voiced understanding. All questions answered. Discharge teaching complete.

## 2024-05-14 NOTE — NURSING
Received report care assumed. Patient lying in bed resting, NAD noted. Safety precautions maintained.    [facility]  Nurses Note -- 4 Eyes      5/13/2024       Skin assessed on: Q Shift      [x] No Pressure Injuries Present    [x]Prevention Measures Documented    [] Yes LDA  for Pressure Injury Previously documented     [] Yes New Pressure Injury Discovered   [] LDA for New Pressure Injury Added      Attending RN:  Yadira Tejada LPN     Second RN:  Jodie Carter RN

## 2024-05-14 NOTE — DISCHARGE SUMMARY
[facility]  Discharge Summary      Patient Name: Awa Vargas  MRN: [id number]  [facility]: [id number]  Patient Class: IP- Inpatient  Admission Date: 5/13/2024  Hospital Length of Stay: 1 days  Discharge Date and Time:  05/14/2024 10:33 AM  Attending Physician: Catie Gardiner, *   Discharging Provider: Catie Gardiner MD  Primary Care Provider: [place] [place]    Primary Care Team: Networked reference to record PCT     HPI:   Awa Vargas is a 53 y.o. female with history of GERD, SVTs on metoprolol presents to the emergency department with chief complaint of chest pain.  Pain 1st started last night around 11:30 p.m..  Patient felt like she was in SVT rhythm at that time.  Patient took her breakthrough metoprolol as prescribed by her cardiologist for when she has breakthrough episodes of SVT.  However, did not relieve the pain.  The pain was a substernal chest pain that radiated down the left arm in the left side of the neck.  Patient was denies any nausea, vomiting, shortness of breath, diarrhea, urinary symptoms or any other symptoms at this time.  Patient was denies any history of blood clots, DVTs, PEs, or any risk factors for clots.S/P one time adenosine 6 mg with conversion to sinus,patient has elevated LFT,she admit consuming alcohol,last alcohol drink was Saturday,few beers,she has elevated troponin 0.103,patient was placed in observation with cardiology consolations.she say she has just some soreness on left side but no chest pain.    * No surgery found *      Hospital Course:   Awa Vargas is a 53 y.o. female with history of GERD, SVTs on metoprolol presents to the emergency department with chief complaint of chest pain. Pain 1st started last night around 11:30 p.m.. Patient felt like she was in SVT rhythm at that time. Patient took her breakthrough metoprolol as prescribed by her cardiologist for when she has breakthrough episodes  of SVT. However, did not relieve the pain. The pain was a substernal chest pain that radiated down the left arm in the left side of the neck. Patient was denies any nausea, vomiting, shortness of breath, diarrhea, urinary symptoms or any other symptoms at this time. Patient was denies any history of blood clots, DVTs, PEs, or any risk factors for clots.S/P one time adenosine 6 mg with conversion to sinus,patient has elevated LFT,she admit consuming alcohol,last alcohol drink was Saturday,few beers,consulted avoid alcohol consumption,she has elevated troponin 0.103,patient was placed in observation with cardiology consolations.she say she has just some soreness on left side but no chest pain. NST was done,show no sign of ischemia,elevated troponin duo to SVT,it trend down,patient was discharged home with BB and follow up with PCP as out patient.     Goals of Care Treatment Preferences:  Code Status: Full Code      Consults:   Consults (From admission, onward)          Status Ordering Provider     Inpatient consult to Cardiology  Once        Provider:  Waylon Aguiar MD    Completed SAVANAH WEEKS            No new Assessment & Plan notes have been filed under this hospital service since the last note was generated.  Service: Hospital Medicine    Final Active Diagnoses:    Diagnosis Date Noted POA    PRINCIPAL PROBLEM:  Elevated troponin [R79.89] 08/31/2020 Yes    SVT (supraventricular tachycardia) [I47.10] 08/30/2020 Yes    Alcohol abuse [F10.10] 05/13/2024 Yes    Transaminitis [R74.01] 05/13/2024 Yes    Obesity, Class I, BMI 30-34.9 [E66.9] 08/28/2017 Yes      Problems Resolved During this Admission:       Discharged Condition: stable    Disposition: Home or Self Care    Follow Up:   Follow-up Information       Cardiology team at [facility]. Schedule an appointment as soon as possible for a visit in 1 week.    Why: For medication adjustment and further evaluation                         Patient  Instructions:   No discharge procedures on file.    Significant Diagnostic Studies: Labs: BMP:   Recent Labs   Lab 05/13/24  0502   *      K 4.1      CO2 23   BUN 24*   CREATININE 0.8   CALCIUM 9.1   , CMP   Recent Labs   Lab 05/13/24  0502      K 4.1      CO2 23   *   BUN 24*   CREATININE 0.8   CALCIUM 9.1   PROT 7.0   ALBUMIN 3.7   BILITOT 0.4   ALKPHOS 76   *   ALT 99*   ANIONGAP 11   , CBC   Recent Labs   Lab 05/13/24  0502   WBC 7.05   HGB 13.4   HCT 42.0      , and Troponin   Recent Labs   Lab 05/13/24  0910 05/13/24  1418 05/13/24  2236   TROPONINI 0.103* 0.111* 0.072*     Radiology: Nuclear Medicine: NST     Pending Diagnostic Studies:       None           Medications:  Reconciled Home Medications:      Medication List        CHANGE how you take these medications      metoprolol succinate 50 MG 24 hr tablet  Commonly known as: TOPROL-XL  Take 50 mg by mouth once daily.  What changed: Another medication with the same name was removed. Continue taking this medication, and follow the directions you see here.            CONTINUE taking these medications      EScitalopram oxalate 20 MG tablet  Commonly known as: LEXAPRO  Take 1 tablet (20 mg total) by mouth once daily.     fluticasone propionate 50 mcg/actuation nasal spray  Commonly known as: FLONASE  2 sprays by Each Nostril route once daily.     losartan 25 MG tablet  Commonly known as: COZAAR  Take 1 tablet (25 mg total) by mouth once daily.     meloxicam 7.5 MG tablet  Commonly known as: MOBIC  Take 7.5 mg by mouth.            ASK your doctor about these medications      hydrOXYzine pamoate 25 MG Cap  Commonly known as: VISTARIL  Take 1 capsule (25 mg total) by mouth 2 (two) times daily as needed (for anxiety).              Indwelling Lines/Drains at time of discharge:   Lines/Drains/Airways       None                   Time spent on the discharge of patient:  over 30  minutes         Catie Gardiner  MD  Department of Hospital Medicine  [facility] - Med Surg

## 2024-05-14 NOTE — HOSPITAL COURSE
Awa Vargas is a 53 y.o. female with history of GERD, SVTs on metoprolol presents to the emergency department with chief complaint of chest pain. Pain 1st started last night around 11:30 p.m.. Patient felt like she was in SVT rhythm at that time. Patient took her breakthrough metoprolol as prescribed by her cardiologist for when she has breakthrough episodes of SVT. However, did not relieve the pain. The pain was a substernal chest pain that radiated down the left arm in the left side of the neck. Patient was denies any nausea, vomiting, shortness of breath, diarrhea, urinary symptoms or any other symptoms at this time. Patient was denies any history of blood clots, DVTs, PEs, or any risk factors for clots.S/P one time adenosine 6 mg with conversion to sinus,patient has elevated LFT,she admit consuming alcohol,last alcohol drink was Saturday,few beers,consulted avoid alcohol consumption,she has elevated troponin 0.103,patient was placed in observation with cardiology consolations.she say she has just some soreness on left side but no chest pain. NST was done,show no sign of ischemia,elevated troponin duo to SVT,it trend down,patient was discharged home with BB and follow up with PCP as out patient.

## 2024-05-16 RX ORDER — PHENTERMINE HYDROCHLORIDE 37.5 MG/1
37.5 TABLET ORAL
Qty: 30 TABLET | Refills: 0 | Status: SHIPPED | OUTPATIENT
Start: 2024-05-16 | End: 2024-06-10 | Stop reason: SDUPTHER

## 2024-05-16 NOTE — PROGRESS NOTES
"Awa desires to restart weight management medication.  She has used Adipex in the past and had good.    Review of systems: No chest pain, shortness of breath, headache, constipation.  Body mass index is 30.18 kg/m².  BP (!) 123/58   Pulse 63   Ht 5' 2" (1.575 m)   Wt 74.8 kg (165 lb)   LMP 04/28/2024 (Exact Date)   BMI 30.18 kg/m²    Exam: Abdomen soft, obese, nontender.  Extremities no cyanosis, clubbing, edema   Assessment: Obesity, weight management   Plan:  Refill Adipex.  Return to clinic in 1 month.  Diet and exercise  "

## 2024-06-05 ENCOUNTER — OFFICE VISIT (OUTPATIENT)
Dept: CARDIOLOGY | Facility: CLINIC | Age: 54
End: 2024-06-05
Payer: MEDICAID

## 2024-06-05 VITALS
HEART RATE: 77 BPM | SYSTOLIC BLOOD PRESSURE: 126 MMHG | OXYGEN SATURATION: 99 % | RESPIRATION RATE: 18 BRPM | BODY MASS INDEX: 30.14 KG/M2 | DIASTOLIC BLOOD PRESSURE: 80 MMHG | WEIGHT: 163.81 LBS | HEIGHT: 62 IN

## 2024-06-05 DIAGNOSIS — R07.89 OTHER CHEST PAIN: ICD-10-CM

## 2024-06-05 DIAGNOSIS — R79.89 ELEVATED TROPONIN: ICD-10-CM

## 2024-06-05 DIAGNOSIS — I47.10 SVT (SUPRAVENTRICULAR TACHYCARDIA): Primary | ICD-10-CM

## 2024-06-05 PROCEDURE — 99213 OFFICE O/P EST LOW 20 MIN: CPT | Mod: PBBFAC | Performed by: NURSE PRACTITIONER

## 2024-06-05 PROCEDURE — 3074F SYST BP LT 130 MM HG: CPT | Mod: CPTII,,, | Performed by: NURSE PRACTITIONER

## 2024-06-05 PROCEDURE — 99999 PR PBB SHADOW E&M-EST. PATIENT-LVL III: CPT | Mod: PBBFAC,,, | Performed by: NURSE PRACTITIONER

## 2024-06-05 PROCEDURE — 4010F ACE/ARB THERAPY RXD/TAKEN: CPT | Mod: CPTII,,, | Performed by: NURSE PRACTITIONER

## 2024-06-05 PROCEDURE — 99214 OFFICE O/P EST MOD 30 MIN: CPT | Mod: S$PBB,,, | Performed by: NURSE PRACTITIONER

## 2024-06-05 PROCEDURE — 3079F DIAST BP 80-89 MM HG: CPT | Mod: CPTII,,, | Performed by: NURSE PRACTITIONER

## 2024-06-05 PROCEDURE — 3008F BODY MASS INDEX DOCD: CPT | Mod: CPTII,,, | Performed by: NURSE PRACTITIONER
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
## 2024-06-06 RX ORDER — METOPROLOL SUCCINATE 50 MG/1
50 TABLET, EXTENDED RELEASE ORAL
Qty: 30 TABLET | Refills: 0 | Status: SHIPPED | OUTPATIENT
Start: 2024-06-06

## 2024-06-10 ENCOUNTER — CLINICAL SUPPORT (OUTPATIENT)
Dept: OBSTETRICS AND GYNECOLOGY | Facility: CLINIC | Age: 54
End: 2024-06-10
Payer: MEDICAID

## 2024-06-10 VITALS
BODY MASS INDEX: 30.18 KG/M2 | SYSTOLIC BLOOD PRESSURE: 129 MMHG | HEIGHT: 62 IN | WEIGHT: 164 LBS | DIASTOLIC BLOOD PRESSURE: 59 MMHG | HEART RATE: 79 BPM

## 2024-06-10 DIAGNOSIS — Z76.89 ENCOUNTER FOR WEIGHT MANAGEMENT: ICD-10-CM

## 2024-06-10 DIAGNOSIS — E66.9 OBESITY (BMI 30-39.9): ICD-10-CM

## 2024-06-10 PROCEDURE — 99999 PR PBB SHADOW E&M-EST. PATIENT-LVL III: CPT | Mod: PBBFAC,,,

## 2024-06-10 PROCEDURE — 99212 OFFICE O/P EST SF 10 MIN: CPT | Mod: S$PBB,,, | Performed by: OBSTETRICS & GYNECOLOGY

## 2024-06-10 PROCEDURE — 99213 OFFICE O/P EST LOW 20 MIN: CPT | Mod: PBBFAC

## 2024-06-10 RX ORDER — PHENTERMINE HYDROCHLORIDE 37.5 MG/1
37.5 TABLET ORAL
Qty: 30 TABLET | Refills: 0 | Status: SHIPPED | OUTPATIENT
Start: 2024-06-10 | End: 2024-07-10

## 2024-06-10 NOTE — PROGRESS NOTES
"Awa desires refill of adipex.   Her current prescription was begun on last month and she has not lost any weight yet.  She desires to continue medication.    Review of systems: No chest pain, shortness of breath, headache, constipation  Body mass index is 30 kg/m².  BP (!) 129/59   Pulse 79   Ht 5' 2" (1.575 m)   Wt 74.4 kg (164 lb)   LMP 06/03/2024 (Approximate)   BMI 30.00 kg/m²    Exam: Abdomen is soft, obese, nontender.  Extremities no cyanosis, clubbing, edema  Assessment:  Obesity, weight management   Plan: Refill Adipex.  Return to clinic in 1 month and diet and exercise  "

## 2025-03-25 ENCOUNTER — HOSPITAL ENCOUNTER (EMERGENCY)
Facility: HOSPITAL | Age: 55
Discharge: HOME | End: 2025-03-25
Attending: EMERGENCY MEDICINE
Payer: MEDICAID

## 2025-03-25 VITALS
BODY MASS INDEX: 34.77 KG/M2 | OXYGEN SATURATION: 92 % | HEART RATE: 100 BPM | SYSTOLIC BLOOD PRESSURE: 111 MMHG | TEMPERATURE: 101 F | RESPIRATION RATE: 19 BRPM | HEIGHT: 62 IN | WEIGHT: 188.94 LBS | DIASTOLIC BLOOD PRESSURE: 56 MMHG

## 2025-03-25 DIAGNOSIS — J06.9 URI, ACUTE: ICD-10-CM

## 2025-03-25 DIAGNOSIS — J10.1 INFLUENZA A: Primary | ICD-10-CM

## 2025-03-25 LAB
INFLUENZA A BY PCR (OHS): POSITIVE
INFLUENZA B BY PCR (OHS): NEGATIVE
RSV A 5' UTR RNA NPH QL NAA+PROBE: NEGATIVE
SARS-COV-2 RNA RESP QL NAA+PROBE: NEGATIVE

## 2025-03-25 PROCEDURE — 96372 THER/PROPH/DIAG INJ SC/IM: CPT | Performed by: EMERGENCY MEDICINE

## 2025-03-25 PROCEDURE — 63600175 PHARM REV CODE 636 W HCPCS: Mod: JZ,TB | Performed by: EMERGENCY MEDICINE

## 2025-03-25 PROCEDURE — 25000003 PHARM REV CODE 250: Performed by: EMERGENCY MEDICINE

## 2025-03-25 PROCEDURE — 0241U SARS-COV2 (COVID) WITH FLU/RSV BY PCR: CPT | Performed by: EMERGENCY MEDICINE

## 2025-03-25 PROCEDURE — 99284 EMERGENCY DEPT VISIT MOD MDM: CPT | Mod: 25

## 2025-03-25 RX ORDER — ACETAMINOPHEN 500 MG
1000 TABLET ORAL
Status: COMPLETED | OUTPATIENT
Start: 2025-03-25 | End: 2025-03-25

## 2025-03-25 RX ORDER — ACETAMINOPHEN 500 MG
1000 TABLET ORAL EVERY 8 HOURS PRN
Qty: 30 TABLET | Refills: 0 | Status: SHIPPED | OUTPATIENT
Start: 2025-03-25

## 2025-03-25 RX ORDER — OSELTAMIVIR PHOSPHATE 75 MG/1
75 CAPSULE ORAL 2 TIMES DAILY
Qty: 10 CAPSULE | Refills: 0 | Status: SHIPPED | OUTPATIENT
Start: 2025-03-25 | End: 2025-03-30

## 2025-03-25 RX ORDER — KETOROLAC TROMETHAMINE 30 MG/ML
15 INJECTION, SOLUTION INTRAMUSCULAR; INTRAVENOUS
Status: COMPLETED | OUTPATIENT
Start: 2025-03-25 | End: 2025-03-25

## 2025-03-25 RX ORDER — ONDANSETRON 4 MG/1
8 TABLET, ORALLY DISINTEGRATING ORAL
Status: COMPLETED | OUTPATIENT
Start: 2025-03-25 | End: 2025-03-25

## 2025-03-25 RX ORDER — ONDANSETRON 4 MG/1
4 TABLET, ORALLY DISINTEGRATING ORAL EVERY 8 HOURS PRN
Qty: 8 TABLET | Refills: 0 | Status: SHIPPED | OUTPATIENT
Start: 2025-03-25

## 2025-03-25 RX ADMIN — ACETAMINOPHEN 1000 MG: 500 TABLET ORAL at 08:03

## 2025-03-25 RX ADMIN — KETOROLAC TROMETHAMINE 15 MG: 30 INJECTION, SOLUTION INTRAMUSCULAR; INTRAVENOUS at 08:03

## 2025-03-25 RX ADMIN — ONDANSETRON 8 MG: 4 TABLET, ORALLY DISINTEGRATING ORAL at 08:03

## 2025-03-26 NOTE — ED PROVIDER NOTES
EMERGENCY DEPARTMENT HISTORY AND PHYSICAL EXAM     This note is dictated on M*Modal word recognition program.  There are word recognition mistakes and grammatical errors that are occasionally missed on review.     Date: 3/25/2025   Patient Name: Awa Vargas       History of Presenting Illness      Chief Complaint   Patient presents with    Fever     Fever, cough, chest congestion, headache x 2 days. Went to [facility] and tested negative for Flu/Covid. Unknown illness exposure.   Took Tylenol at 4 pm.            Awa Vargas is a 54 y.o. female with PMHX of hypertension, SVT who presents to the emergency department C/O malaise.    Patient reports fever, cough, congestion, headache for the past 2 days.  Was seen in urgent care yesterday head negative flu and COVID testing.  Was prescribed azithromycin, promethazine cough syrup, and albuterol inhaler.  Patient says she came to ED because she continues to feel bad.  Took 500 mg of acetaminophen about 4 hours prior to arrival.  She reports nausea.  She denies tobacco use, she reports social alcohol use.  No  complaints    PCP: [facility]      Current Medications[1]        Past History     Past Medical History:   Past Medical History:   Diagnosis Date    Calculus of gallbladder     Chronic gastritis 08/28/2017    H. pylori infection     Hyperbilirubinemia 06/15/2017    Hypertension     Obesity     SOB (shortness of breath)         Past Surgical History:   Past Surgical History:   Procedure Laterality Date    APPENDECTOMY      CHOLECYSTECTOMY N/A 08/29/2017    COLONOSCOPY N/A 12/15/2021    Procedure: COLONOSCOPY;  Surgeon: Leigh East MD;  Location: [facility];  Service: General;  Laterality: N/A;  3  0700    WISDOM TOOTH EXTRACTION          Family History:   Family History   Problem Relation Name Age of Onset    No Known Problems Mother      Heart attack Father  62    Breast cancer Father      Lung cancer Maternal  "Grandmother      Breast cancer Paternal Aunt      Breast cancer Paternal Uncle      Breast cancer Paternal Grandmother      No Known Problems Sister      No Known Problems Daughter      No Known Problems Maternal Aunt      No Known Problems Maternal Uncle      No Known Problems Maternal Grandfather      No Known Problems Paternal Grandfather      Ovarian cancer Neg Hx      BRCA 1/2 Neg Hx          Social History:   Social History[2]     Allergies:   Review of patient's allergies indicates:  No Known Allergies       Review of Systems   Review of Systems   See HPI for pertinent positives and negatives       Physical Exam     Vitals:    03/25/25 1933 03/25/25 1934   BP: (!) 156/94    BP Location: Right arm    Patient Position: Sitting    Pulse: (!) 115    Resp: 20    Temp: (!) 102.4 °F (39.1 °C)    TempSrc: Oral    SpO2: 98%    Weight: 85.7 kg (188 lb 15 oz)    Height:  5' 2" (1.575 m)      Physical Exam  Vitals and nursing note reviewed.   Constitutional:       General: She is not in acute distress.     Appearance: Normal appearance.   HENT:      Head: Normocephalic and atraumatic.      Right Ear: External ear normal.      Left Ear: External ear normal.      Nose: Nose normal. No congestion or rhinorrhea.      Mouth/Throat:      Mouth: Mucous membranes are moist.   Eyes:      Conjunctiva/sclera: Conjunctivae normal.      Pupils: Pupils are equal, round, and reactive to light.   Cardiovascular:      Rate and Rhythm: Regular rhythm. Tachycardia present.      Heart sounds: Normal heart sounds.   Pulmonary:      Effort: Pulmonary effort is normal. No respiratory distress.      Breath sounds: Normal breath sounds. No rhonchi.   Musculoskeletal:         General: No deformity. Normal range of motion.      Cervical back: Normal range of motion. No rigidity.   Skin:     General: Skin is dry.   Neurological:      General: No focal deficit present.      Mental Status: She is alert and oriented to person, place, and time. Mental " status is at baseline.   Psychiatric:         Mood and Affect: Mood normal.         Behavior: Behavior normal.              Diagnostic Study Results      Labs -   No results found for this or any previous visit (from the past 12 hours).     Radiologic Studies -    X-Ray Chest 1 View    (Results Pending)        Medications given in the ED-   Medications   acetaminophen tablet 1,000 mg (has no administration in time range)   ketorolac injection 15 mg (has no administration in time range)           Medical Decision Making    I am the first provider for this patient.     I reviewed the vital signs, available nursing notes, past medical history, past surgical history, family history and social history.     Vital Signs:  Reviewed the patient's vital signs.     Pulse Oximetry Analysis and Interpretation:    98% on Room Air, normal        CXR  Interpretation: (Per my independent interpretation, pending formal read)   CXR read by Dr. Ted Chi     Cardiac silhouette normal, lung fields clear, no focal infiltrate     External Test Results (Pertinent to encounter):    Records Reviewed: Nursing Notes, Current Prescription Medications, Old Medical Records, and External Medical Records     History Obtained By: Patient    Provider Notes: Awa Vargas is a 54 y.o. female with flu-like illness    Co-morbidities Considered:  None    Differential Diagnosis:  Flu, viral URI, bronchitis, pneumonia, sepsis      ED Course:    Patient presents with body aches, headache, fever and flu-like illness.    This is day 2 of illness.  Flu testing was positive here.  Patient in no acute distress.  Well-perfused.  Normal work of breathing.  Fever went down with antipyretics in ED. Patient tolerating p.o..  Will prescribe Tamiflu.  Discussed symptom management.  She was advised to discontinue the azithromycin as no indication of bacterial infection.  Advised follow up with primary care doctor.    ED Course as of 03/26/25 0132   Tue Mar  25, 2025 2057 Influenza A, Molecular(!): Positive [MO]      ED Course User Index  [MO] Ted Chi MD       Problems Addressed:  Flu    Procedures:   Procedures       Diagnosis and Disposition     Critical Care:      DISCHARGE NOTE:       Awa Vargas's  results have been reviewed with her.  She has been counseled regarding her diagnosis, treatment, and plan.  She verbally conveys understanding and agreement of the signs, symptoms, diagnosis, treatment and prognosis and additionally agrees to follow up as discussed.  She also agrees with the care-plan and conveys that all of her questions have been answered.  I have also provided discharge instructions for her that include: educational information regarding their diagnosis and treatment, and list of reasons why they would want to return to the ED prior to their follow-up appointment, should her condition change. She has been provided with education for proper emergency department utilization.         CLINICAL IMPRESSION:         1. Influenza A    2. URI, acute              PLAN:   1. Discharge Home  2.      Medication List        ASK your doctor about these medications      EScitalopram oxalate 20 MG tablet  Commonly known as: LEXAPRO  Take 1 tablet (20 mg total) by mouth once daily.     fluticasone propionate 50 mcg/actuation nasal spray  Commonly known as: FLONASE     hydrOXYzine pamoate 25 MG Cap  Commonly known as: VISTARIL  Take 1 capsule (25 mg total) by mouth 2 (two) times daily as needed (for anxiety).     losartan 25 MG tablet  Commonly known as: COZAAR  Take 1 tablet (25 mg total) by mouth once daily.     meloxicam 7.5 MG tablet  Commonly known as: MOBIC     metoprolol succinate 50 MG 24 hr tablet  Commonly known as: TOPROL-XL  Take 1 tablet by mouth once daily             3. No follow-up provider specified.     _______________________________     Please note that this dictation was completed with M*Modal, the computer voice recognition  software.  Quite often unanticipated grammatical, syntax, homophones, and other interpretive errors are inadvertently transcribed by the computer software.  Please disregard these errors.  Please excuse any errors that have escaped final proofreading.               [1]   Current Facility-Administered Medications   Medication Dose Route Frequency Provider Last Rate Last Admin    acetaminophen tablet 1,000 mg  1,000 mg Oral ED 1 Time Ted Chi MD        ketorolac injection 15 mg  15 mg Intramuscular ED 1 Time Ted Chi MD         Current Outpatient Medications   Medication Sig Dispense Refill    EScitalopram oxalate (LEXAPRO) 20 MG tablet Take 1 tablet (20 mg total) by mouth once daily. 90 tablet 3    fluticasone propionate (FLONASE) 50 mcg/actuation nasal spray 2 sprays by Each Nostril route once daily.      hydrOXYzine pamoate (VISTARIL) 25 MG Cap Take 1 capsule (25 mg total) by mouth 2 (two) times daily as needed (for anxiety). 30 capsule 1    losartan (COZAAR) 25 MG tablet Take 1 tablet (25 mg total) by mouth once daily. 90 tablet 3    meloxicam (MOBIC) 7.5 MG tablet Take 7.5 mg by mouth.      metoprolol succinate (TOPROL-XL) 50 MG 24 hr tablet Take 1 tablet by mouth once daily 30 tablet 0   [2]   Social History  Tobacco Use    Smoking status: Never    Smokeless tobacco: Never   Substance Use Topics    Alcohol use: Yes     Comment: socially     Drug use: No        Ted Chi MD  03/26/25 0133

## 2025-04-01 ENCOUNTER — HOSPITAL ENCOUNTER (OUTPATIENT)
Dept: RADIOLOGY | Facility: HOSPITAL | Age: 55
Discharge: HOME | End: 2025-04-01
Attending: NURSE PRACTITIONER
Payer: MEDICAID

## 2025-04-01 DIAGNOSIS — R05.9 COUGH: ICD-10-CM

## 2025-04-01 DIAGNOSIS — R05.9 COUGH: Primary | ICD-10-CM

## 2025-04-01 PROCEDURE — 71046 X-RAY EXAM CHEST 2 VIEWS: CPT | Mod: TC

## 2025-04-10 ENCOUNTER — OFFICE VISIT (OUTPATIENT)
Dept: OBSTETRICS AND GYNECOLOGY | Facility: CLINIC | Age: 55
End: 2025-04-10
Payer: MEDICAID

## 2025-04-10 VITALS
HEIGHT: 62 IN | DIASTOLIC BLOOD PRESSURE: 56 MMHG | SYSTOLIC BLOOD PRESSURE: 99 MMHG | BODY MASS INDEX: 33.86 KG/M2 | HEART RATE: 67 BPM | WEIGHT: 184 LBS

## 2025-04-10 DIAGNOSIS — Z97.5 IUD (INTRAUTERINE DEVICE) IN PLACE: ICD-10-CM

## 2025-04-10 DIAGNOSIS — Z01.419 ENCOUNTER FOR ANNUAL ROUTINE GYNECOLOGICAL EXAMINATION: Primary | ICD-10-CM

## 2025-04-10 DIAGNOSIS — Z12.31 SCREENING MAMMOGRAM FOR BREAST CANCER: Primary | ICD-10-CM

## 2025-04-10 DIAGNOSIS — R10.2 PELVIC PAIN: ICD-10-CM

## 2025-04-10 PROCEDURE — 99999 PR PBB SHADOW E&M-EST. PATIENT-LVL III: CPT | Mod: PBBFAC,,, | Performed by: OBSTETRICS & GYNECOLOGY

## 2025-04-10 PROCEDURE — 99213 OFFICE O/P EST LOW 20 MIN: CPT | Mod: PBBFAC | Performed by: OBSTETRICS & GYNECOLOGY

## 2025-04-10 RX ORDER — NAPROXEN 500 MG/1
500 TABLET ORAL 2 TIMES DAILY WITH MEALS
Qty: 60 TABLET | Refills: 2 | Status: SHIPPED | OUTPATIENT
Start: 2025-04-10 | End: 2026-04-10

## 2025-04-10 NOTE — PROGRESS NOTES
"SUBJECTIVE:   54 y.o. female for annual routine wellness checkup.  Complains of left-sided lower abdominal/pelvic pain that comes and goes.  She states the pain has been occurring for the past 5 months.  When the pain is present it is 8/10, but she is fine when it goes away    Reports no issues with bowels-no constipation    Current Medications[1]  Allergies: Patient has no known allergies.   No LMP recorded. Patient has had an implant.    ROS:  Feeling well. No dyspnea or chest pain on exertion.  No abdominal pain, change in bowel habits, black or bloody stools.  No urinary tract symptoms. GYN ROS: no breast pain or new or enlarging lumps on self exam, no vaginal bleeding, she complains of left sided pelvic pain. No neurological complaints.    OBJECTIVE:   The patient appears well, alert, oriented x 3, in no distress.  BP (!) 99/56   Pulse 67   Ht 5' 2" (1.575 m)   Wt 83.5 kg (184 lb)   BMI 33.65 kg/m²   ENT normal.  Neck supple. No adenopathy or thyromegaly. STANISLAW. Lungs are clear, good air entry, no wheezes, rhonchi or rales. S1 and S2 normal, no murmurs, regular rate and rhythm. Abdomen soft without tenderness, guarding, mass or organomegaly. Extremities show no edema, normal peripheral pulses. Neurological is normal, no focal findings.    BREAST EXAM: breasts appear normal, no suspicious masses, no skin or nipple changes or axillary nodes    PELVIC EXAM: VULVA: normal appearing vulva with no masses, tenderness or lesions, VAGINA: normal appearing vagina with normal color and discharge, no lesions, CERVIX: normal appearing cervix without discharge or lesions, UTERUS: uterus is normal size, shape, consistency and nontender, IUD string visible, ADNEXA:  Right adnexa no masses or tenderness noted; left adnexa with fullness and mildly tender.    Discussed need for ultrasound to evaluate for probable ovarian cyst.  All questions answered    ASSESSMENT:   well woman  IUD in place  Pelvic pain    PLAN:   return " annually or prn  Rtc for ultrasound         [1]   Current Outpatient Medications   Medication Sig Dispense Refill    acetaminophen (TYLENOL) 500 MG tablet Take 2 tablets (1,000 mg total) by mouth every 8 (eight) hours as needed for Pain or Temperature greater than (101). 30 tablet 0    EScitalopram oxalate (LEXAPRO) 20 MG tablet Take 1 tablet (20 mg total) by mouth once daily. 90 tablet 3    fluticasone propionate (FLONASE) 50 mcg/actuation nasal spray 2 sprays by Each Nostril route once daily.      losartan (COZAAR) 25 MG tablet Take 1 tablet (25 mg total) by mouth once daily. 90 tablet 3    meloxicam (MOBIC) 7.5 MG tablet Take 7.5 mg by mouth.      metoprolol succinate (TOPROL-XL) 50 MG 24 hr tablet Take 1 tablet by mouth once daily 30 tablet 0    hydrOXYzine pamoate (VISTARIL) 25 MG Cap Take 1 capsule (25 mg total) by mouth 2 (two) times daily as needed (for anxiety). (Patient not taking: Reported on 4/10/2025) 30 capsule 1    ondansetron (ZOFRAN-ODT) 4 MG TbDL Take 1 tablet (4 mg total) by mouth every 8 (eight) hours as needed (Nasuea). 8 tablet 0     No current facility-administered medications for this visit.

## 2025-04-16 ENCOUNTER — HOSPITAL ENCOUNTER (OUTPATIENT)
Dept: RADIOLOGY | Facility: HOSPITAL | Age: 55
Discharge: HOME | End: 2025-04-16
Attending: OBSTETRICS & GYNECOLOGY
Payer: MEDICAID

## 2025-04-16 VITALS — HEIGHT: 62 IN | BODY MASS INDEX: 33.86 KG/M2 | WEIGHT: 184 LBS

## 2025-04-16 DIAGNOSIS — Z12.31 SCREENING MAMMOGRAM FOR BREAST CANCER: ICD-10-CM

## 2025-04-16 PROCEDURE — 77063 BREAST TOMOSYNTHESIS BI: CPT | Mod: TC

## 2025-04-21 ENCOUNTER — RESULTS FOLLOW-UP (OUTPATIENT)
Dept: OBSTETRICS AND GYNECOLOGY | Facility: CLINIC | Age: 55
End: 2025-04-21

## 2025-08-05 ENCOUNTER — OFFICE VISIT (OUTPATIENT)
Dept: OBSTETRICS AND GYNECOLOGY | Facility: CLINIC | Age: 55
End: 2025-08-05
Payer: MEDICAID

## 2025-08-05 VITALS
HEART RATE: 75 BPM | HEIGHT: 62 IN | BODY MASS INDEX: 33.13 KG/M2 | WEIGHT: 180 LBS | SYSTOLIC BLOOD PRESSURE: 140 MMHG | DIASTOLIC BLOOD PRESSURE: 88 MMHG

## 2025-08-05 DIAGNOSIS — Z97.5 IUD (INTRAUTERINE DEVICE) IN PLACE: ICD-10-CM

## 2025-08-05 DIAGNOSIS — R23.2 HOT FLASHES: ICD-10-CM

## 2025-08-05 DIAGNOSIS — G47.9 SLEEP DISTURBANCE: ICD-10-CM

## 2025-08-05 DIAGNOSIS — N95.1 MENOPAUSAL SYMPTOMS: ICD-10-CM

## 2025-08-05 DIAGNOSIS — Z12.4 CERVICAL CANCER SCREENING: Primary | ICD-10-CM

## 2025-08-05 DIAGNOSIS — R61 NIGHT SWEATS: ICD-10-CM

## 2025-08-05 PROCEDURE — 1160F RVW MEDS BY RX/DR IN RCRD: CPT | Mod: CPTII,,, | Performed by: OBSTETRICS & GYNECOLOGY

## 2025-08-05 PROCEDURE — 3008F BODY MASS INDEX DOCD: CPT | Mod: CPTII,,, | Performed by: OBSTETRICS & GYNECOLOGY

## 2025-08-05 PROCEDURE — 3077F SYST BP >= 140 MM HG: CPT | Mod: CPTII,,, | Performed by: OBSTETRICS & GYNECOLOGY

## 2025-08-05 PROCEDURE — 4010F ACE/ARB THERAPY RXD/TAKEN: CPT | Mod: CPTII,,, | Performed by: OBSTETRICS & GYNECOLOGY

## 2025-08-05 PROCEDURE — 1159F MED LIST DOCD IN RCRD: CPT | Mod: CPTII,,, | Performed by: OBSTETRICS & GYNECOLOGY

## 2025-08-05 PROCEDURE — 99213 OFFICE O/P EST LOW 20 MIN: CPT | Mod: S$PBB,,, | Performed by: OBSTETRICS & GYNECOLOGY

## 2025-08-05 PROCEDURE — 3079F DIAST BP 80-89 MM HG: CPT | Mod: CPTII,,, | Performed by: OBSTETRICS & GYNECOLOGY

## 2025-08-05 PROCEDURE — 99999 PR PBB SHADOW E&M-EST. PATIENT-LVL III: CPT | Mod: PBBFAC,,, | Performed by: OBSTETRICS & GYNECOLOGY

## 2025-08-05 PROCEDURE — 99213 OFFICE O/P EST LOW 20 MIN: CPT | Mod: PBBFAC | Performed by: OBSTETRICS & GYNECOLOGY

## 2025-08-05 RX ORDER — PROGESTERONE 100 MG/1
100 CAPSULE ORAL NIGHTLY
Qty: 30 CAPSULE | Refills: 11 | Status: SHIPPED | OUTPATIENT
Start: 2025-08-05 | End: 2026-08-05

## 2025-08-05 RX ORDER — ESTRADIOL 0.05 MG/D
1 FILM, EXTENDED RELEASE TRANSDERMAL
Qty: 8 PATCH | Refills: 11 | Status: SHIPPED | OUTPATIENT
Start: 2025-08-07 | End: 2026-08-07

## 2025-08-05 NOTE — PROGRESS NOTES
Subjective:    Patient ID: Awa Vargas is a 55 y.o. y.o. female    Chief Complaint:   Chief Complaint   Patient presents with    Annual Exam     Pap smear. Had annual in April but was not due for pap.        History of Present Illness:  Awa presents today for Pap. Patient had annual exam in April but Pap was not due to be collected until now.  She also complains of some menopausal symptoms that have developed over the last 2-3 months.  She notices hot flashes and night sweats as well as issues with sleep.  She complains of fatigue and appears to be tired all the time.      Review of Systems   Constitutional:  Positive for fatigue. Negative for chills and fever.   Respiratory:  Negative for shortness of breath.    Cardiovascular:  Negative for chest pain.   Gastrointestinal:  Negative for constipation.   Genitourinary:  Positive for hot flashes. Negative for dysmenorrhea and vaginal bleeding.   Neurological:  Negative for headaches.   Psychiatric/Behavioral:  Positive for sleep disturbance.          Objective:    Vital Signs:  Vitals:    08/05/25 0841   BP: (!) 140/88   Pulse: 75     Wt Readings from Last 1 Encounters:   08/05/25 81.6 kg (180 lb)     Body mass index is 32.92 kg/m².    Physical Exam:  General:  alert, no distress   Skin:  Skin color, texture, turgor normal. No rashes or lesions   Neck: supple, trachea midline, no adenopathy or thyromegaly   Respiratory:  clear to auscultation bilaterally   Heart:  regular rate and rhythm, S1, S2 normal, no murmur, click, rub or gallop   Abdomen:  Soft, non-tender. Bowel sounds normal. No masses,  no organomegaly   Pelvis: External genitalia: normal general appearance  Urinary system: urethral meatus normal, bladder nontender  Vaginal: normal mucosa without prolapse or lesions  Cervix: normal appearance, thin prep PAP obtained, IUD string visualized  Uterus: normal single, nontender  Adnexa: normal bimanual exam     Discussed her menopausal symptoms and  recommend hormone replacement therapy.  Discussed the importance of progesterone in conjunction with estrogen since she has a uterus.  Even though she does have the progesterone secreting IUD, progesterone replacement could help with her symptoms of sleep disturbance and night sweats.  Use and side effects of medication discussed and she desires to proceed.  All questions answered    I spent a total of 20 minutes on the day of the visit.  This includes face to face time and non-face to face time preparing to see the patient (eg, review of tests), obtaining and/or reviewing separately obtained history, documenting clinical information in the electronic or other health record, independently interpreting results and communicating results to the patient/family/caregiver, or care coordinator.           Assessment:      1. Cervical cancer screening    2. IUD (intrauterine device) in place    3. Menopausal symptoms    4. Night sweats    5. Sleep disturbance    6. Hot flashes          Plan:      Cervical cancer screening  -     Liquid-Based Pap Smear, Screening    IUD (intrauterine device) in place    Menopausal symptoms  -     estradiol 0.05 mg/24 hr td ptsw (VIVELLE-DOT) 0.05 mg/24 hr; Place 1 patch onto the skin twice a week.  Dispense: 8 patch; Refill: 11  -     progesterone (PROMETRIUM) 100 MG capsule; Take 1 capsule (100 mg total) by mouth nightly.  Dispense: 30 capsule; Refill: 11    Night sweats  -     estradiol 0.05 mg/24 hr td ptsw (VIVELLE-DOT) 0.05 mg/24 hr; Place 1 patch onto the skin twice a week.  Dispense: 8 patch; Refill: 11  -     progesterone (PROMETRIUM) 100 MG capsule; Take 1 capsule (100 mg total) by mouth nightly.  Dispense: 30 capsule; Refill: 11    Sleep disturbance  -     estradiol 0.05 mg/24 hr td ptsw (VIVELLE-DOT) 0.05 mg/24 hr; Place 1 patch onto the skin twice a week.  Dispense: 8 patch; Refill: 11  -     progesterone (PROMETRIUM) 100 MG capsule; Take 1 capsule (100 mg total) by mouth nightly.   Dispense: 30 capsule; Refill: 11    Hot flashes  -     estradiol 0.05 mg/24 hr td ptsw (VIVELLE-DOT) 0.05 mg/24 hr; Place 1 patch onto the skin twice a week.  Dispense: 8 patch; Refill: 11  -     progesterone (PROMETRIUM) 100 MG capsule; Take 1 capsule (100 mg total) by mouth nightly.  Dispense: 30 capsule; Refill: 11    Return to clinic in 3 months for follow up of symptoms       Lucila Orta MD, FACOG   08/05/2025 8:53 AM

## 2025-08-06 ENCOUNTER — OFFICE VISIT (OUTPATIENT)
Dept: CARDIOLOGY | Facility: CLINIC | Age: 55
End: 2025-08-06
Payer: MEDICAID

## 2025-08-06 VITALS
HEIGHT: 62 IN | RESPIRATION RATE: 18 BRPM | BODY MASS INDEX: 33.6 KG/M2 | DIASTOLIC BLOOD PRESSURE: 94 MMHG | WEIGHT: 182.56 LBS | SYSTOLIC BLOOD PRESSURE: 149 MMHG | HEART RATE: 77 BPM | OXYGEN SATURATION: 98 %

## 2025-08-06 DIAGNOSIS — I47.10 SVT (SUPRAVENTRICULAR TACHYCARDIA): ICD-10-CM

## 2025-08-06 DIAGNOSIS — I10 HYPERTENSION, UNSPECIFIED TYPE: ICD-10-CM

## 2025-08-06 DIAGNOSIS — R79.89 ELEVATED TROPONIN: ICD-10-CM

## 2025-08-06 DIAGNOSIS — R07.9 CHEST PAIN, UNSPECIFIED TYPE: Primary | ICD-10-CM

## 2025-08-06 DIAGNOSIS — Z13.6 ENCOUNTER FOR SCREENING FOR CARDIOVASCULAR DISORDERS: ICD-10-CM

## 2025-08-06 LAB
OHS QRS DURATION: 84 MS
OHS QTC CALCULATION: 437 MS

## 2025-08-06 PROCEDURE — 1159F MED LIST DOCD IN RCRD: CPT | Mod: CPTII,,, | Performed by: NURSE PRACTITIONER

## 2025-08-06 PROCEDURE — 3080F DIAST BP >= 90 MM HG: CPT | Mod: CPTII,,, | Performed by: NURSE PRACTITIONER

## 2025-08-06 PROCEDURE — 99999 PR PBB SHADOW E&M-EST. PATIENT-LVL III: CPT | Mod: PBBFAC,,, | Performed by: NURSE PRACTITIONER

## 2025-08-06 PROCEDURE — 99213 OFFICE O/P EST LOW 20 MIN: CPT | Mod: PBBFAC | Performed by: NURSE PRACTITIONER

## 2025-08-06 PROCEDURE — 99214 OFFICE O/P EST MOD 30 MIN: CPT | Mod: S$PBB,25,, | Performed by: NURSE PRACTITIONER

## 2025-08-06 PROCEDURE — 93010 ELECTROCARDIOGRAM REPORT: CPT | Mod: S$PBB,,, | Performed by: INTERNAL MEDICINE

## 2025-08-06 PROCEDURE — 4010F ACE/ARB THERAPY RXD/TAKEN: CPT | Mod: CPTII,,, | Performed by: NURSE PRACTITIONER

## 2025-08-06 PROCEDURE — 3077F SYST BP >= 140 MM HG: CPT | Mod: CPTII,,, | Performed by: NURSE PRACTITIONER

## 2025-08-06 PROCEDURE — 3008F BODY MASS INDEX DOCD: CPT | Mod: CPTII,,, | Performed by: NURSE PRACTITIONER

## 2025-08-06 PROCEDURE — 93005 ELECTROCARDIOGRAM TRACING: CPT | Mod: PBBFAC | Performed by: INTERNAL MEDICINE

## 2025-08-06 RX ORDER — METOPROLOL SUCCINATE 50 MG/1
50 TABLET, EXTENDED RELEASE ORAL DAILY
Qty: 90 TABLET | Refills: 3 | Status: SHIPPED | OUTPATIENT
Start: 2025-08-06

## 2025-08-06 RX ORDER — LOSARTAN POTASSIUM 50 MG/1
50 TABLET ORAL DAILY
Qty: 90 TABLET | Refills: 3 | Status: SHIPPED | OUTPATIENT
Start: 2025-08-06 | End: 2026-08-06

## 2025-08-06 NOTE — PROGRESS NOTES
Cardiology Clinic note    Subjective:   Patient ID:  Awa Vargas is a 55 y.o. female who presents for evaluation of chest discomfort    HPI:   Awa Vargas  has a past medical history of Calculus of gallbladder, Chronic gastritis (08/28/2017), H. pylori infection, Hyperbilirubinemia (06/15/2017), Hypertension, Obesity, and SOB (shortness of breath).    Here for evaluation chest discomfort  Nonexertional     Echo 5/2024 normal   MPI 5/2024 normal   EKG today NSR  Holter 2/2024 no significant ectopy    HTN; uncontrolled  BB reduced by PCP- unclear reasons    Obesity; gained 19lbs since last year     SVT; BB      Patient Active Problem List    Diagnosis Date Noted    Alcohol abuse 05/13/2024    Transaminitis 05/13/2024    Diverticulosis 12/15/2021    Chest pain 08/31/2020    Elevated troponin 08/31/2020    Anxiety 08/31/2020    SVT (supraventricular tachycardia) 08/30/2020    Calculus of gallbladder without cholecystitis without obstruction 08/29/2017    Obesity, Class I, BMI 30-34.9 08/28/2017    Chronic gastritis 08/28/2017    GERD (gastroesophageal reflux disease)     Calculus of gallbladder 08/08/2017     Scheduled for lap juan on 07/28/2017      Epigastric pain 06/15/2017       Patient's Medications   New Prescriptions    No medications on file   Previous Medications    ACETAMINOPHEN (TYLENOL) 500 MG TABLET    Take 2 tablets (1,000 mg total) by mouth every 8 (eight) hours as needed for Pain or Temperature greater than (101).    ESCITALOPRAM OXALATE (LEXAPRO) 20 MG TABLET    Take 1 tablet (20 mg total) by mouth once daily.    ESTRADIOL 0.05 MG/24 HR TD PTSW (VIVELLE-DOT) 0.05 MG/24 HR    Place 1 patch onto the skin twice a week.    FLUTICASONE PROPIONATE (FLONASE) 50 MCG/ACTUATION NASAL SPRAY    2 sprays by Each Nostril route once daily.    MELOXICAM (MOBIC) 7.5 MG TABLET    Take 7.5 mg by mouth.    NAPROXEN (NAPROSYN) 500 MG TABLET    Take 1 tablet (500 mg total) by mouth 2 (two) times daily  "with meals.    PROGESTERONE (PROMETRIUM) 100 MG CAPSULE    Take 1 capsule (100 mg total) by mouth nightly.   Modified Medications    Modified Medication Previous Medication    LOSARTAN (COZAAR) 50 MG TABLET losartan (COZAAR) 25 MG tablet       Take 1 tablet (50 mg total) by mouth once daily.    Take 1 tablet (25 mg total) by mouth once daily.    METOPROLOL SUCCINATE (TOPROL-XL) 50 MG 24 HR TABLET metoprolol succinate (TOPROL-XL) 50 MG 24 hr tablet       Take 1 tablet (50 mg total) by mouth once daily.    Take 1 tablet by mouth once daily   Discontinued Medications    HYDROXYZINE PAMOATE (VISTARIL) 25 MG CAP    Take 1 capsule (25 mg total) by mouth 2 (two) times daily as needed (for anxiety).        Review of Systems   Constitutional: Negative.   Cardiovascular:  Positive for chest pain, near-syncope and palpitations. Negative for claudication, cyanosis, dyspnea on exertion, irregular heartbeat, leg swelling, orthopnea, paroxysmal nocturnal dyspnea and syncope.   Respiratory:  Positive for shortness of breath.    Skin: Negative.    Musculoskeletal: Negative.          Objective:   Vitals  Vitals:    08/06/25 1147   BP: (!) 149/94   Pulse: 77   Resp: 18   SpO2: 98%   Weight: 82.8 kg (182 lb 8.7 oz)   Height: 5' 2" (1.575 m)          Physical Exam  Constitutional:       Appearance: She is obese.   Cardiovascular:      Rate and Rhythm: Normal rate and regular rhythm.      Pulses: Normal pulses.      Heart sounds: Normal heart sounds.   Pulmonary:      Effort: Pulmonary effort is normal.      Breath sounds: Normal breath sounds.   Musculoskeletal:         General: Normal range of motion.   Skin:     General: Skin is warm.      Capillary Refill: Capillary refill takes less than 2 seconds.   Neurological:      Mental Status: She is alert.   Psychiatric:         Mood and Affect: Mood normal.           Lab Results    Lab Results   Component Value Date    WBC 7.05 05/13/2024    HGB 13.4 05/13/2024    HCT 42.0 05/13/2024    MCV " "97 05/13/2024       Lab Results   Component Value Date     05/13/2024       Lab Results   Component Value Date    K 4.1 05/13/2024    MG 2.1 09/01/2020    BUN 24 (H) 05/13/2024    CREATININE 0.8 05/13/2024       Lab Results   Component Value Date     (H) 05/13/2024       Lab Results   Component Value Date     (H) 05/13/2024    ALT 99 (H) 05/13/2024    ALBUMIN 3.7 05/13/2024    PROT 7.0 05/13/2024       No results found for: "CHOL", "HDL", "LDLCALC", "TRIG"    Lab Results   Component Value Date     (H) 05/13/2024         Assessment:     Problem List Items Addressed This Visit       SVT (supraventricular tachycardia)    Chest pain - Primary    Elevated troponin     Other Visit Diagnoses         Encounter for screening for cardiovascular disorders          Hypertension, unspecified type                Plan:     EKG NSR today   Symptoms likely due to HTN  Increase losartan 50mg   Increase Toprol back to 50mg   Discussed weight loss    Continue with current medical plan and lifestyle changes.      Orders Placed This Encounter   Procedures    CT Cardiac Scoring     Standing Status:   Future     Expected Date:   8/6/2025     Expiration Date:   8/6/2026     May the Radiologist modify the order per protocol to meet the clinical needs of the patient?:   Yes    CBC Auto Differential     Standing Status:   Future     Expected Date:   8/6/2025     Expiration Date:   2/6/2027     Send normal result to authorizing provider's In Basket if patient is active on MyChart::   Yes    Comprehensive Metabolic Panel     Standing Status:   Future     Expected Date:   8/6/2025     Expiration Date:   2/6/2027     Send normal result to authorizing provider's In Basket if patient is active on MyChart::   Yes    Lipid Panel     fasting     Standing Status:   Future     Expected Date:   8/6/2025     Expiration Date:   2/6/2027     Send normal result to authorizing provider's In Basket if patient is active on MyChart::   " Yes    TSH     Standing Status:   Future     Expected Date:   8/6/2025     Expiration Date:   2/6/2027     Send normal result to authorizing provider's In Basket if patient is active on MyChart::   Yes    IN OFFICE EKG 12-LEAD (to Muse)    EKG 12-lead       Follow up in 2-3 w   Return sooner for concerns or questions. If symptoms persist go to the ED    She expressed verbal understanding and agreed with the plan    Thank you for the opportunity to care for this patient. Will be available for questions if needed.     Total duration of face to face visit time 30 minutes.  Total time spent counseling greater than fifty percent of total visit time.  Counseling included discussion regarding imaging findings, diagnosis, possibilities, treatment options, risks and benefits.    Astrid Mireles, JAMEL-C  Cardiology Clinic  [facility]

## 2025-08-19 ENCOUNTER — RESULTS FOLLOW-UP (OUTPATIENT)
Dept: OBSTETRICS AND GYNECOLOGY | Facility: CLINIC | Age: 55
End: 2025-08-19
Payer: MEDICAID

## (undated) DEVICE — GOWN SURGICAL BRTHBL XL

## (undated) DEVICE — TIP YANKAUERS BULB NO VENT

## (undated) DEVICE — CANNULA SUPERSOFT CO2 M AD 7FT

## (undated) DEVICE — TUBE SUC UNIVERSAL .25XIN 6FT

## (undated) DEVICE — ELECTRODE FOAM 535 TEARDROP

## (undated) DEVICE — KIT VIA CUSTOM PROCEDURE

## (undated) DEVICE — SOL IRRI STRL WATER 1000ML

## (undated) DEVICE — TUBING OXYGEN CONNECT BUBBLE

## (undated) DEVICE — LINER SUCTION CANNISTER REGUGA

## (undated) DEVICE — SPONGE DRY VIA GREEN

## (undated) DEVICE — UNDERPAD DISPOSABLE 30X30IN

## (undated) DEVICE — KIT BIOGUARD AIR WTR SUC VALVE